# Patient Record
Sex: MALE | Race: ASIAN | NOT HISPANIC OR LATINO
[De-identification: names, ages, dates, MRNs, and addresses within clinical notes are randomized per-mention and may not be internally consistent; named-entity substitution may affect disease eponyms.]

---

## 2017-01-17 ENCOUNTER — RX RENEWAL (OUTPATIENT)
Age: 67
End: 2017-01-17

## 2017-01-20 ENCOUNTER — RX RENEWAL (OUTPATIENT)
Age: 67
End: 2017-01-20

## 2017-02-23 ENCOUNTER — LABORATORY RESULT (OUTPATIENT)
Age: 67
End: 2017-02-23

## 2017-02-24 ENCOUNTER — APPOINTMENT (OUTPATIENT)
Dept: INTERNAL MEDICINE | Facility: CLINIC | Age: 67
End: 2017-02-24

## 2017-02-25 ENCOUNTER — RESULT REVIEW (OUTPATIENT)
Age: 67
End: 2017-02-25

## 2017-03-01 ENCOUNTER — RESULT REVIEW (OUTPATIENT)
Age: 67
End: 2017-03-01

## 2017-03-10 ENCOUNTER — RX RENEWAL (OUTPATIENT)
Age: 67
End: 2017-03-10

## 2017-03-20 ENCOUNTER — APPOINTMENT (OUTPATIENT)
Dept: INTERNAL MEDICINE | Facility: CLINIC | Age: 67
End: 2017-03-20

## 2017-03-20 RX ORDER — CYANOCOBALAMIN 1000 UG/ML
1000 INJECTION INTRAMUSCULAR; SUBCUTANEOUS
Qty: 0 | Refills: 0 | Status: COMPLETED | OUTPATIENT
Start: 2017-03-20

## 2017-03-20 RX ADMIN — CYANOCOBALAMIN 0 MCG/ML: 1000 INJECTION, SOLUTION INTRAMUSCULAR at 00:00

## 2017-03-21 ENCOUNTER — RX RENEWAL (OUTPATIENT)
Age: 67
End: 2017-03-21

## 2017-03-21 ENCOUNTER — APPOINTMENT (OUTPATIENT)
Dept: INTERNAL MEDICINE | Facility: CLINIC | Age: 67
End: 2017-03-21

## 2017-03-22 ENCOUNTER — APPOINTMENT (OUTPATIENT)
Dept: INTERNAL MEDICINE | Facility: CLINIC | Age: 67
End: 2017-03-22

## 2017-03-22 RX ORDER — CYANOCOBALAMIN 1000 UG/ML
1000 INJECTION INTRAMUSCULAR; SUBCUTANEOUS
Qty: 0 | Refills: 0 | Status: COMPLETED | OUTPATIENT
Start: 2017-03-22

## 2017-03-22 RX ADMIN — CYANOCOBALAMIN 0 MCG/ML: 1000 INJECTION INTRAMUSCULAR; SUBCUTANEOUS at 00:00

## 2017-03-23 ENCOUNTER — APPOINTMENT (OUTPATIENT)
Dept: INTERNAL MEDICINE | Facility: CLINIC | Age: 67
End: 2017-03-23

## 2017-03-24 ENCOUNTER — APPOINTMENT (OUTPATIENT)
Dept: INTERNAL MEDICINE | Facility: CLINIC | Age: 67
End: 2017-03-24

## 2017-03-24 RX ORDER — CYANOCOBALAMIN 1000 UG/ML
1000 INJECTION INTRAMUSCULAR; SUBCUTANEOUS
Qty: 0 | Refills: 0 | Status: COMPLETED | OUTPATIENT
Start: 2017-03-24

## 2017-03-24 RX ADMIN — CYANOCOBALAMIN 0 MCG/ML: 1000 INJECTION INTRAMUSCULAR; SUBCUTANEOUS at 00:00

## 2017-03-30 ENCOUNTER — APPOINTMENT (OUTPATIENT)
Dept: INTERNAL MEDICINE | Facility: CLINIC | Age: 67
End: 2017-03-30

## 2017-03-30 VITALS — DIASTOLIC BLOOD PRESSURE: 70 MMHG | SYSTOLIC BLOOD PRESSURE: 110 MMHG

## 2017-03-30 DIAGNOSIS — Z23 ENCOUNTER FOR IMMUNIZATION: ICD-10-CM

## 2017-03-30 RX ORDER — CYANOCOBALAMIN 1000 UG/ML
1000 INJECTION INTRAMUSCULAR; SUBCUTANEOUS
Qty: 0 | Refills: 0 | Status: COMPLETED | OUTPATIENT
Start: 2017-03-30

## 2017-03-30 RX ADMIN — CYANOCOBALAMIN 0 MCG/ML: 1000 INJECTION INTRAMUSCULAR; SUBCUTANEOUS at 00:00

## 2017-04-05 ENCOUNTER — RX RENEWAL (OUTPATIENT)
Age: 67
End: 2017-04-05

## 2017-04-14 ENCOUNTER — RX RENEWAL (OUTPATIENT)
Age: 67
End: 2017-04-14

## 2017-05-31 ENCOUNTER — RX RENEWAL (OUTPATIENT)
Age: 67
End: 2017-05-31

## 2017-05-31 ENCOUNTER — APPOINTMENT (OUTPATIENT)
Dept: INTERNAL MEDICINE | Facility: CLINIC | Age: 67
End: 2017-05-31

## 2017-05-31 VITALS — SYSTOLIC BLOOD PRESSURE: 120 MMHG | DIASTOLIC BLOOD PRESSURE: 80 MMHG

## 2017-05-31 DIAGNOSIS — Z01.818 ENCOUNTER FOR OTHER PREPROCEDURAL EXAMINATION: ICD-10-CM

## 2017-05-31 RX ADMIN — CYANOCOBALAMIN 0 MCG/ML: 1000 INJECTION, SOLUTION INTRAMUSCULAR at 00:00

## 2017-06-01 RX ORDER — CYANOCOBALAMIN 1000 UG/ML
1000 INJECTION INTRAMUSCULAR; SUBCUTANEOUS
Qty: 0 | Refills: 0 | Status: COMPLETED | OUTPATIENT
Start: 2017-05-31

## 2017-06-26 ENCOUNTER — RX RENEWAL (OUTPATIENT)
Age: 67
End: 2017-06-26

## 2017-07-14 ENCOUNTER — RX RENEWAL (OUTPATIENT)
Age: 67
End: 2017-07-14

## 2017-07-17 ENCOUNTER — RX RENEWAL (OUTPATIENT)
Age: 67
End: 2017-07-17

## 2017-07-31 ENCOUNTER — APPOINTMENT (OUTPATIENT)
Dept: INTERNAL MEDICINE | Facility: CLINIC | Age: 67
End: 2017-07-31

## 2017-08-12 ENCOUNTER — RX RENEWAL (OUTPATIENT)
Age: 67
End: 2017-08-12

## 2017-08-14 ENCOUNTER — RX RENEWAL (OUTPATIENT)
Age: 67
End: 2017-08-14

## 2017-09-26 ENCOUNTER — MEDICATION RENEWAL (OUTPATIENT)
Age: 67
End: 2017-09-26

## 2017-10-02 ENCOUNTER — RX RENEWAL (OUTPATIENT)
Age: 67
End: 2017-10-02

## 2017-10-02 RX ORDER — LEVOTHYROXINE SODIUM 0.1 MG/1
100 TABLET ORAL DAILY
Qty: 30 | Refills: 3 | Status: DISCONTINUED | COMMUNITY
Start: 2017-03-10 | End: 2017-10-02

## 2017-10-03 ENCOUNTER — RX RENEWAL (OUTPATIENT)
Age: 67
End: 2017-10-03

## 2017-10-03 ENCOUNTER — MEDICATION RENEWAL (OUTPATIENT)
Age: 67
End: 2017-10-03

## 2017-10-06 ENCOUNTER — RX RENEWAL (OUTPATIENT)
Age: 67
End: 2017-10-06

## 2017-10-11 ENCOUNTER — RX RENEWAL (OUTPATIENT)
Age: 67
End: 2017-10-11

## 2017-10-16 ENCOUNTER — APPOINTMENT (OUTPATIENT)
Dept: INTERNAL MEDICINE | Facility: CLINIC | Age: 67
End: 2017-10-16
Payer: MEDICAID

## 2017-10-16 VITALS
DIASTOLIC BLOOD PRESSURE: 80 MMHG | WEIGHT: 196 LBS | HEIGHT: 68 IN | SYSTOLIC BLOOD PRESSURE: 120 MMHG | BODY MASS INDEX: 29.7 KG/M2

## 2017-10-16 DIAGNOSIS — F32.9 MAJOR DEPRESSIVE DISORDER, SINGLE EPISODE, UNSPECIFIED: ICD-10-CM

## 2017-10-16 PROCEDURE — 96372 THER/PROPH/DIAG INJ SC/IM: CPT

## 2017-10-16 PROCEDURE — 99214 OFFICE O/P EST MOD 30 MIN: CPT | Mod: 25

## 2017-10-16 PROCEDURE — 36415 COLL VENOUS BLD VENIPUNCTURE: CPT

## 2017-10-16 RX ORDER — CYANOCOBALAMIN 1000 UG/ML
1000 INJECTION INTRAMUSCULAR; SUBCUTANEOUS
Qty: 0 | Refills: 0 | Status: COMPLETED | OUTPATIENT
Start: 2017-10-16

## 2017-10-16 RX ADMIN — CYANOCOBALAMIN 0 MCG/ML: 1000 INJECTION INTRAMUSCULAR; SUBCUTANEOUS at 00:00

## 2017-10-17 ENCOUNTER — RESULT REVIEW (OUTPATIENT)
Age: 67
End: 2017-10-17

## 2017-10-17 LAB
ALBUMIN SERPL ELPH-MCNC: 4.3 G/DL
ALP BLD-CCNC: 55 U/L
ALT SERPL-CCNC: 29 U/L
ANION GAP SERPL CALC-SCNC: 17 MMOL/L
AST SERPL-CCNC: 25 U/L
BASOPHILS # BLD AUTO: 0.05 K/UL
BASOPHILS NFR BLD AUTO: 0.7 %
BILIRUB SERPL-MCNC: 0.6 MG/DL
BUN SERPL-MCNC: 13 MG/DL
CALCIUM SERPL-MCNC: 10.2 MG/DL
CHLORIDE SERPL-SCNC: 93 MMOL/L
CO2 SERPL-SCNC: 24 MMOL/L
CREAT SERPL-MCNC: 1.49 MG/DL
EOSINOPHIL # BLD AUTO: 0.21 K/UL
EOSINOPHIL NFR BLD AUTO: 3 %
GLUCOSE SERPL-MCNC: 198 MG/DL
HBA1C MFR BLD HPLC: 6.4 %
HCT VFR BLD CALC: 39.8 %
HGB BLD-MCNC: 13 G/DL
IMM GRANULOCYTES NFR BLD AUTO: 0.1 %
LYMPHOCYTES # BLD AUTO: 2.97 K/UL
LYMPHOCYTES NFR BLD AUTO: 42.2 %
MAN DIFF?: NORMAL
MCHC RBC-ENTMCNC: 28.2 PG
MCHC RBC-ENTMCNC: 32.7 GM/DL
MCV RBC AUTO: 86.3 FL
MONOCYTES # BLD AUTO: 0.41 K/UL
MONOCYTES NFR BLD AUTO: 5.8 %
NEUTROPHILS # BLD AUTO: 3.38 K/UL
NEUTROPHILS NFR BLD AUTO: 48.2 %
PLATELET # BLD AUTO: 282 K/UL
POTASSIUM SERPL-SCNC: 5.2 MMOL/L
PROT SERPL-MCNC: 8.1 G/DL
RBC # BLD: 4.61 M/UL
RBC # FLD: 15.6 %
SODIUM SERPL-SCNC: 134 MMOL/L
WBC # FLD AUTO: 7.03 K/UL

## 2017-10-30 ENCOUNTER — APPOINTMENT (OUTPATIENT)
Dept: INTERNAL MEDICINE | Facility: CLINIC | Age: 67
End: 2017-10-30
Payer: MEDICAID

## 2017-10-30 PROCEDURE — 96372 THER/PROPH/DIAG INJ SC/IM: CPT

## 2017-10-30 RX ORDER — CYANOCOBALAMIN 1000 UG/ML
1000 INJECTION INTRAMUSCULAR; SUBCUTANEOUS
Qty: 0 | Refills: 0 | Status: COMPLETED | OUTPATIENT
Start: 2017-10-30

## 2017-10-30 RX ADMIN — CYANOCOBALAMIN 0 MCG/ML: 1000 INJECTION INTRAMUSCULAR; SUBCUTANEOUS at 00:00

## 2017-10-31 ENCOUNTER — RESULT REVIEW (OUTPATIENT)
Age: 67
End: 2017-10-31

## 2017-10-31 LAB
ANION GAP SERPL CALC-SCNC: 15 MMOL/L
APPEARANCE: CLEAR
BILIRUBIN URINE: NEGATIVE
BLOOD URINE: NEGATIVE
BUN SERPL-MCNC: 9 MG/DL
CALCIUM SERPL-MCNC: 9.5 MG/DL
CHLORIDE SERPL-SCNC: 94 MMOL/L
CO2 SERPL-SCNC: 24 MMOL/L
COLOR: YELLOW
CREAT SERPL-MCNC: 1.1 MG/DL
CREAT SPEC-SCNC: 113 MG/DL
CREAT/PROT UR: 0.1 RATIO
GLUCOSE QUALITATIVE U: NEGATIVE MG/DL
GLUCOSE SERPL-MCNC: 159 MG/DL
KETONES URINE: NEGATIVE
LEUKOCYTE ESTERASE URINE: NEGATIVE
NITRITE URINE: NEGATIVE
PH URINE: 6.5
POTASSIUM SERPL-SCNC: 5.2 MMOL/L
PROT UR-MCNC: 10 MG/DL
PROTEIN URINE: NEGATIVE MG/DL
SODIUM ?TM SUB UR QN: 73 MMOL/L
SODIUM SERPL-SCNC: 133 MMOL/L
SPECIFIC GRAVITY URINE: 1.02
UROBILINOGEN URINE: NEGATIVE MG/DL

## 2017-11-06 ENCOUNTER — MEDICATION RENEWAL (OUTPATIENT)
Age: 67
End: 2017-11-06

## 2017-11-06 ENCOUNTER — RX RENEWAL (OUTPATIENT)
Age: 67
End: 2017-11-06

## 2017-12-06 ENCOUNTER — RX RENEWAL (OUTPATIENT)
Age: 67
End: 2017-12-06

## 2017-12-15 ENCOUNTER — RX RENEWAL (OUTPATIENT)
Age: 67
End: 2017-12-15

## 2017-12-15 RX ORDER — BLOOD SUGAR DIAGNOSTIC
STRIP MISCELLANEOUS 3 TIMES DAILY
Qty: 100 | Refills: 1 | Status: ACTIVE | COMMUNITY
Start: 2017-04-05 | End: 1900-01-01

## 2017-12-29 ENCOUNTER — RX RENEWAL (OUTPATIENT)
Age: 67
End: 2017-12-29

## 2018-01-09 ENCOUNTER — RX RENEWAL (OUTPATIENT)
Age: 68
End: 2018-01-09

## 2018-01-20 ENCOUNTER — RX RENEWAL (OUTPATIENT)
Age: 68
End: 2018-01-20

## 2018-02-20 ENCOUNTER — RX RENEWAL (OUTPATIENT)
Age: 68
End: 2018-02-20

## 2018-03-09 ENCOUNTER — RX RENEWAL (OUTPATIENT)
Age: 68
End: 2018-03-09

## 2018-03-12 ENCOUNTER — APPOINTMENT (OUTPATIENT)
Dept: INTERNAL MEDICINE | Facility: CLINIC | Age: 68
End: 2018-03-12
Payer: MEDICARE

## 2018-03-12 ENCOUNTER — LABORATORY RESULT (OUTPATIENT)
Age: 68
End: 2018-03-12

## 2018-03-12 ENCOUNTER — NON-APPOINTMENT (OUTPATIENT)
Age: 68
End: 2018-03-12

## 2018-03-12 VITALS
HEIGHT: 67 IN | SYSTOLIC BLOOD PRESSURE: 120 MMHG | BODY MASS INDEX: 30.29 KG/M2 | WEIGHT: 193 LBS | DIASTOLIC BLOOD PRESSURE: 70 MMHG

## 2018-03-12 DIAGNOSIS — Z87.898 PERSONAL HISTORY OF OTHER SPECIFIED CONDITIONS: ICD-10-CM

## 2018-03-12 DIAGNOSIS — H10.13 ACUTE ATOPIC CONJUNCTIVITIS, BILATERAL: ICD-10-CM

## 2018-03-12 DIAGNOSIS — E16.0 DRUG-INDUCED HYPOGLYCEMIA W/OUT COMA: ICD-10-CM

## 2018-03-12 DIAGNOSIS — M79.644 PAIN IN RIGHT FINGER(S): ICD-10-CM

## 2018-03-12 PROCEDURE — 96372 THER/PROPH/DIAG INJ SC/IM: CPT

## 2018-03-12 PROCEDURE — G0438: CPT

## 2018-03-12 PROCEDURE — G0402 INITIAL PREVENTIVE EXAM: CPT

## 2018-03-12 PROCEDURE — G0008: CPT

## 2018-03-12 PROCEDURE — 90732 PPSV23 VACC 2 YRS+ SUBQ/IM: CPT

## 2018-03-12 PROCEDURE — G0009: CPT

## 2018-03-12 PROCEDURE — 93000 ELECTROCARDIOGRAM COMPLETE: CPT

## 2018-03-12 PROCEDURE — 90686 IIV4 VACC NO PRSV 0.5 ML IM: CPT

## 2018-03-12 RX ORDER — CYANOCOBALAMIN 1000 UG/ML
1000 INJECTION INTRAMUSCULAR; SUBCUTANEOUS
Qty: 0 | Refills: 0 | Status: COMPLETED | OUTPATIENT
Start: 2018-03-12

## 2018-03-12 RX ADMIN — CYANOCOBALAMIN 0 MCG/ML: 1000 INJECTION INTRAMUSCULAR; SUBCUTANEOUS at 00:00

## 2018-03-26 ENCOUNTER — RX RENEWAL (OUTPATIENT)
Age: 68
End: 2018-03-26

## 2018-03-30 ENCOUNTER — RX RENEWAL (OUTPATIENT)
Age: 68
End: 2018-03-30

## 2018-04-13 ENCOUNTER — RX RENEWAL (OUTPATIENT)
Age: 68
End: 2018-04-13

## 2018-04-30 ENCOUNTER — RX RENEWAL (OUTPATIENT)
Age: 68
End: 2018-04-30

## 2018-05-01 ENCOUNTER — MED ADMIN CHARGE (OUTPATIENT)
Age: 68
End: 2018-05-01

## 2018-05-01 ENCOUNTER — APPOINTMENT (OUTPATIENT)
Dept: INTERNAL MEDICINE | Facility: CLINIC | Age: 68
End: 2018-05-01
Payer: MEDICARE

## 2018-05-01 VITALS — TEMPERATURE: 97.5 F | SYSTOLIC BLOOD PRESSURE: 110 MMHG | DIASTOLIC BLOOD PRESSURE: 80 MMHG

## 2018-05-01 DIAGNOSIS — M54.5 LOW BACK PAIN: ICD-10-CM

## 2018-05-01 DIAGNOSIS — G89.29 LOW BACK PAIN: ICD-10-CM

## 2018-05-01 PROCEDURE — 99214 OFFICE O/P EST MOD 30 MIN: CPT | Mod: 25

## 2018-05-01 PROCEDURE — 36415 COLL VENOUS BLD VENIPUNCTURE: CPT

## 2018-05-01 PROCEDURE — 96372 THER/PROPH/DIAG INJ SC/IM: CPT

## 2018-05-01 RX ORDER — CYANOCOBALAMIN 1000 UG/ML
1000 INJECTION INTRAMUSCULAR; SUBCUTANEOUS
Qty: 0 | Refills: 0 | Status: COMPLETED | OUTPATIENT
Start: 2018-05-01

## 2018-05-01 RX ORDER — CYCLOBENZAPRINE HYDROCHLORIDE 10 MG/1
10 TABLET, FILM COATED ORAL
Qty: 90 | Refills: 0 | Status: DISCONTINUED | COMMUNITY
Start: 2018-02-20 | End: 2018-05-01

## 2018-05-01 RX ADMIN — CYANOCOBALAMIN 0 MCG/ML: 1000 INJECTION INTRAMUSCULAR; SUBCUTANEOUS at 00:00

## 2018-05-03 ENCOUNTER — MEDICATION RENEWAL (OUTPATIENT)
Age: 68
End: 2018-05-03

## 2018-05-04 ENCOUNTER — APPOINTMENT (OUTPATIENT)
Dept: INTERNAL MEDICINE | Facility: CLINIC | Age: 68
End: 2018-05-04

## 2018-05-10 ENCOUNTER — MEDICATION RENEWAL (OUTPATIENT)
Age: 68
End: 2018-05-10

## 2018-05-11 ENCOUNTER — RX RENEWAL (OUTPATIENT)
Age: 68
End: 2018-05-11

## 2018-05-15 ENCOUNTER — RX RENEWAL (OUTPATIENT)
Age: 68
End: 2018-05-15

## 2018-07-03 ENCOUNTER — RX RENEWAL (OUTPATIENT)
Age: 68
End: 2018-07-03

## 2018-07-16 ENCOUNTER — RX RENEWAL (OUTPATIENT)
Age: 68
End: 2018-07-16

## 2018-07-17 ENCOUNTER — RX RENEWAL (OUTPATIENT)
Age: 68
End: 2018-07-17

## 2018-07-27 ENCOUNTER — RX RENEWAL (OUTPATIENT)
Age: 68
End: 2018-07-27

## 2018-07-30 ENCOUNTER — APPOINTMENT (OUTPATIENT)
Dept: INTERNAL MEDICINE | Facility: CLINIC | Age: 68
End: 2018-07-30
Payer: MEDICARE

## 2018-07-30 VITALS
SYSTOLIC BLOOD PRESSURE: 110 MMHG | DIASTOLIC BLOOD PRESSURE: 80 MMHG | BODY MASS INDEX: 29.82 KG/M2 | HEIGHT: 67 IN | WEIGHT: 190 LBS

## 2018-07-30 DIAGNOSIS — Z98.890 OTHER SPECIFIED POSTPROCEDURAL STATES: ICD-10-CM

## 2018-07-30 DIAGNOSIS — B35.1 TINEA UNGUIUM: ICD-10-CM

## 2018-07-30 PROCEDURE — 36415 COLL VENOUS BLD VENIPUNCTURE: CPT

## 2018-07-30 PROCEDURE — 99214 OFFICE O/P EST MOD 30 MIN: CPT | Mod: 25

## 2018-07-30 PROCEDURE — 90471 IMMUNIZATION ADMIN: CPT | Mod: GY

## 2018-07-30 PROCEDURE — 90715 TDAP VACCINE 7 YRS/> IM: CPT | Mod: GY

## 2018-07-30 PROCEDURE — 96372 THER/PROPH/DIAG INJ SC/IM: CPT

## 2018-07-30 RX ORDER — AZITHROMYCIN 250 MG/1
250 TABLET, FILM COATED ORAL
Qty: 1 | Refills: 0 | Status: DISCONTINUED | COMMUNITY
Start: 2018-05-03 | End: 2018-07-30

## 2018-07-30 RX ADMIN — CYANOCOBALAMIN 0 MCG/ML: 1000 INJECTION INTRAMUSCULAR; SUBCUTANEOUS at 00:00

## 2018-07-31 ENCOUNTER — APPOINTMENT (OUTPATIENT)
Dept: INTERNAL MEDICINE | Facility: CLINIC | Age: 68
End: 2018-07-31
Payer: MEDICARE

## 2018-07-31 ENCOUNTER — RESULT REVIEW (OUTPATIENT)
Age: 68
End: 2018-07-31

## 2018-07-31 LAB
ALBUMIN SERPL ELPH-MCNC: 4.8 G/DL
ALP BLD-CCNC: 73 U/L
ALT SERPL-CCNC: 19 U/L
ANION GAP SERPL CALC-SCNC: 15 MMOL/L
AST SERPL-CCNC: 28 U/L
BILIRUB SERPL-MCNC: 0.3 MG/DL
BUN SERPL-MCNC: 14 MG/DL
CALCIUM SERPL-MCNC: 10.4 MG/DL
CHLORIDE SERPL-SCNC: 92 MMOL/L
CO2 SERPL-SCNC: 25 MMOL/L
CREAT SERPL-MCNC: 1.18 MG/DL
GLUCOSE SERPL-MCNC: 69 MG/DL
POTASSIUM SERPL-SCNC: 5.9 MMOL/L
PROT SERPL-MCNC: 8 G/DL
SODIUM SERPL-SCNC: 132 MMOL/L

## 2018-07-31 PROCEDURE — 36415 COLL VENOUS BLD VENIPUNCTURE: CPT

## 2018-07-31 RX ORDER — LAMOTRIGINE 100 MG/1
100 TABLET ORAL
Refills: 0 | Status: DISCONTINUED | COMMUNITY
Start: 2018-07-30 | End: 2018-07-31

## 2018-07-31 RX ORDER — CYANOCOBALAMIN 1000 UG/ML
1000 INJECTION INTRAMUSCULAR; SUBCUTANEOUS
Qty: 0 | Refills: 0 | Status: COMPLETED | OUTPATIENT
Start: 2018-07-30

## 2018-08-01 LAB
ANION GAP SERPL CALC-SCNC: 14 MMOL/L
BUN SERPL-MCNC: 11 MG/DL
CALCIUM SERPL-MCNC: 9.8 MG/DL
CHLORIDE SERPL-SCNC: 93 MMOL/L
CO2 SERPL-SCNC: 25 MMOL/L
CREAT SERPL-MCNC: 1.15 MG/DL
GLUCOSE SERPL-MCNC: 126 MG/DL
POTASSIUM SERPL-SCNC: 5.1 MMOL/L
SODIUM SERPL-SCNC: 132 MMOL/L

## 2018-08-24 ENCOUNTER — RX RENEWAL (OUTPATIENT)
Age: 68
End: 2018-08-24

## 2018-08-29 LAB — C TETANI IGG SER-ACNC: <0.1 IU/ML

## 2018-10-01 ENCOUNTER — RX RENEWAL (OUTPATIENT)
Age: 68
End: 2018-10-01

## 2018-10-17 ENCOUNTER — RX RENEWAL (OUTPATIENT)
Age: 68
End: 2018-10-17

## 2018-10-17 VITALS
HEIGHT: 67 IN | DIASTOLIC BLOOD PRESSURE: 80 MMHG | WEIGHT: 222 LBS | SYSTOLIC BLOOD PRESSURE: 120 MMHG | BODY MASS INDEX: 34.84 KG/M2 | TEMPERATURE: 99.1 F

## 2018-11-19 ENCOUNTER — RX RENEWAL (OUTPATIENT)
Age: 68
End: 2018-11-19

## 2018-11-27 ENCOUNTER — RX RENEWAL (OUTPATIENT)
Age: 68
End: 2018-11-27

## 2018-11-27 ENCOUNTER — APPOINTMENT (OUTPATIENT)
Dept: INTERNAL MEDICINE | Facility: CLINIC | Age: 68
End: 2018-11-27
Payer: MEDICARE

## 2018-11-27 DIAGNOSIS — L03.115 CELLULITIS OF RIGHT LOWER LIMB: ICD-10-CM

## 2018-11-27 DIAGNOSIS — N39.0 URINARY TRACT INFECTION, SITE NOT SPECIFIED: ICD-10-CM

## 2018-11-27 LAB
ALBUMIN SERPL ELPH-MCNC: 4.4 G/DL
ALP BLD-CCNC: 78 U/L
ALT SERPL-CCNC: 17 U/L
ANION GAP SERPL CALC-SCNC: 15 MMOL/L
AST SERPL-CCNC: 19 U/L
BASOPHILS # BLD AUTO: 0.02 K/UL
BASOPHILS NFR BLD AUTO: 0.2 %
BILIRUB SERPL-MCNC: 0.5 MG/DL
BILIRUB UR QL STRIP: NEGATIVE
BUN SERPL-MCNC: 10 MG/DL
CALCIUM SERPL-MCNC: 9.8 MG/DL
CHLORIDE SERPL-SCNC: 91 MMOL/L
CLARITY UR: CLEAR
CO2 SERPL-SCNC: 25 MMOL/L
COLLECTION METHOD: NORMAL
CREAT SERPL-MCNC: 1.19 MG/DL
EOSINOPHIL # BLD AUTO: 0.18 K/UL
EOSINOPHIL NFR BLD AUTO: 2.1 %
GLUCOSE SERPL-MCNC: 126 MG/DL
GLUCOSE UR-MCNC: NEGATIVE
HBA1C MFR BLD HPLC: 6.1 %
HCG UR QL: 0.2 EU/DL
HCT VFR BLD CALC: 37.2 %
HGB BLD-MCNC: 12.3 G/DL
HGB UR QL STRIP.AUTO: ABNORMAL
IMM GRANULOCYTES NFR BLD AUTO: 0.1 %
KETONES UR-MCNC: NEGATIVE
LACTATE BLDA-MCNC: 1 MMOL/L
LEUKOCYTE ESTERASE UR QL STRIP: ABNORMAL
LPL SERPL-CCNC: 42 U/L
LYMPHOCYTES # BLD AUTO: 1.52 K/UL
LYMPHOCYTES NFR BLD AUTO: 17.6 %
MAN DIFF?: NORMAL
MCHC RBC-ENTMCNC: 26.7 PG
MCHC RBC-ENTMCNC: 33.1 GM/DL
MCV RBC AUTO: 80.7 FL
MONOCYTES # BLD AUTO: 0.83 K/UL
MONOCYTES NFR BLD AUTO: 9.6 %
NEUTROPHILS # BLD AUTO: 6.07 K/UL
NEUTROPHILS NFR BLD AUTO: 70.4 %
NITRITE UR QL STRIP: NEGATIVE
PH UR STRIP: 7
PLATELET # BLD AUTO: 311 K/UL
POTASSIUM SERPL-SCNC: 5 MMOL/L
PROT SERPL-MCNC: 8.1 G/DL
PROT UR STRIP-MCNC: ABNORMAL
RBC # BLD: 4.61 M/UL
RBC # FLD: 14.6 %
SODIUM SERPL-SCNC: 131 MMOL/L
SP GR UR STRIP: 1.01
TSH SERPL-ACNC: 10.5 UIU/ML
WBC # FLD AUTO: 8.63 K/UL

## 2018-11-27 PROCEDURE — 81003 URINALYSIS AUTO W/O SCOPE: CPT | Mod: QW

## 2018-11-27 PROCEDURE — 36415 COLL VENOUS BLD VENIPUNCTURE: CPT

## 2018-11-27 PROCEDURE — 99215 OFFICE O/P EST HI 40 MIN: CPT | Mod: 25

## 2018-11-27 PROCEDURE — 96372 THER/PROPH/DIAG INJ SC/IM: CPT

## 2018-11-27 RX ORDER — CYANOCOBALAMIN 1000 UG/ML
1000 INJECTION INTRAMUSCULAR; SUBCUTANEOUS
Qty: 0 | Refills: 0 | Status: COMPLETED | OUTPATIENT
Start: 2018-11-27

## 2018-11-27 RX ORDER — TERBINAFINE HYDROCHLORIDE 250 MG/1
250 TABLET ORAL
Refills: 0 | Status: DISCONTINUED | COMMUNITY
Start: 2018-07-31 | End: 2018-11-27

## 2018-11-27 RX ORDER — CEPHALEXIN 500 MG/1
500 TABLET ORAL EVERY 6 HOURS
Qty: 28 | Refills: 0 | Status: DISCONTINUED | COMMUNITY
Start: 2018-07-30 | End: 2018-11-27

## 2018-11-27 RX ORDER — CYANOCOBALAMIN 1000 UG/ML
1000 INJECTION INTRAMUSCULAR; SUBCUTANEOUS
Qty: 0.5 | Refills: 0 | Status: DISCONTINUED | COMMUNITY
Start: 2018-07-30 | End: 2018-11-27

## 2018-11-27 RX ADMIN — CYANOCOBALAMIN 0 MCG/ML: 1000 INJECTION INTRAMUSCULAR; SUBCUTANEOUS at 00:00

## 2018-11-28 LAB — BACTERIA UR CULT: NORMAL

## 2018-12-05 ENCOUNTER — APPOINTMENT (OUTPATIENT)
Dept: INTERNAL MEDICINE | Facility: CLINIC | Age: 68
End: 2018-12-05
Payer: MEDICARE

## 2018-12-05 VITALS — DIASTOLIC BLOOD PRESSURE: 80 MMHG | SYSTOLIC BLOOD PRESSURE: 120 MMHG | TEMPERATURE: 98 F

## 2018-12-05 DIAGNOSIS — R80.9 PROTEINURIA, UNSPECIFIED: ICD-10-CM

## 2018-12-05 LAB
BILIRUB UR QL STRIP: ABNORMAL
CLARITY UR: CLEAR
COLLECTION METHOD: NORMAL
GLUCOSE UR-MCNC: NEGATIVE
HCG UR QL: 0.2 EU/DL
HGB UR QL STRIP.AUTO: NEGATIVE
KETONES UR-MCNC: ABNORMAL
LEUKOCYTE ESTERASE UR QL STRIP: NEGATIVE
NITRITE UR QL STRIP: NEGATIVE
PH UR STRIP: 5.5
PROT UR STRIP-MCNC: ABNORMAL
SP GR UR STRIP: 1.02

## 2018-12-05 PROCEDURE — 99214 OFFICE O/P EST MOD 30 MIN: CPT | Mod: 25

## 2018-12-05 PROCEDURE — 81003 URINALYSIS AUTO W/O SCOPE: CPT | Mod: QW

## 2018-12-05 PROCEDURE — 96372 THER/PROPH/DIAG INJ SC/IM: CPT

## 2018-12-05 RX ORDER — AMOXICILLIN 500 MG/1
500 CAPSULE ORAL
Qty: 21 | Refills: 0 | Status: COMPLETED | COMMUNITY
Start: 2018-08-16

## 2018-12-05 RX ORDER — CEPHALEXIN 500 MG/1
500 CAPSULE ORAL
Qty: 28 | Refills: 0 | Status: COMPLETED | COMMUNITY
Start: 2018-07-30

## 2018-12-05 RX ORDER — SULFAMETHOXAZOLE AND TRIMETHOPRIM 800; 160 MG/1; MG/1
800-160 TABLET ORAL TWICE DAILY
Qty: 20 | Refills: 0 | Status: DISCONTINUED | COMMUNITY
Start: 2018-11-27 | End: 2018-12-05

## 2018-12-05 RX ORDER — CEFPODOXIME PROXETIL 100 MG/1
100 TABLET, FILM COATED ORAL
Qty: 20 | Refills: 0 | Status: COMPLETED | COMMUNITY
Start: 2018-11-25

## 2018-12-05 RX ORDER — OXYCODONE AND ACETAMINOPHEN 5; 325 MG/1; MG/1
5-325 TABLET ORAL
Qty: 20 | Refills: 0 | Status: COMPLETED | COMMUNITY
Start: 2018-07-18

## 2018-12-05 RX ORDER — DICLOFENAC SODIUM 10 MG/G
1 GEL TOPICAL
Qty: 100 | Refills: 0 | Status: COMPLETED | COMMUNITY
Start: 2018-06-12

## 2018-12-05 RX ORDER — CYANOCOBALAMIN 1000 UG/ML
1000 INJECTION INTRAMUSCULAR; SUBCUTANEOUS
Qty: 0 | Refills: 0 | Status: COMPLETED | OUTPATIENT
Start: 2018-12-05

## 2018-12-05 RX ADMIN — CYANOCOBALAMIN 0 MCG/ML: 1000 INJECTION INTRAMUSCULAR; SUBCUTANEOUS at 00:00

## 2018-12-06 ENCOUNTER — APPOINTMENT (OUTPATIENT)
Dept: INTERNAL MEDICINE | Facility: CLINIC | Age: 68
End: 2018-12-06
Payer: MEDICARE

## 2018-12-06 PROCEDURE — 96372 THER/PROPH/DIAG INJ SC/IM: CPT

## 2018-12-06 RX ORDER — CYANOCOBALAMIN 1000 UG/ML
1000 INJECTION INTRAMUSCULAR; SUBCUTANEOUS
Qty: 0 | Refills: 0 | Status: COMPLETED | OUTPATIENT
Start: 2018-12-06

## 2018-12-06 RX ADMIN — CYANOCOBALAMIN 1 MCG/ML: 1000 INJECTION INTRAMUSCULAR; SUBCUTANEOUS at 00:00

## 2018-12-07 ENCOUNTER — RESULT REVIEW (OUTPATIENT)
Age: 68
End: 2018-12-07

## 2018-12-07 ENCOUNTER — APPOINTMENT (OUTPATIENT)
Dept: INTERNAL MEDICINE | Facility: CLINIC | Age: 68
End: 2018-12-07
Payer: MEDICARE

## 2018-12-07 ENCOUNTER — MED ADMIN CHARGE (OUTPATIENT)
Age: 68
End: 2018-12-07

## 2018-12-07 LAB
BACTERIA UR CULT: NORMAL
C TRACH RRNA SPEC QL NAA+PROBE: NOT DETECTED
N GONORRHOEA RRNA SPEC QL NAA+PROBE: NOT DETECTED
SOURCE AMPLIFICATION: NORMAL

## 2018-12-07 PROCEDURE — 96372 THER/PROPH/DIAG INJ SC/IM: CPT

## 2018-12-07 RX ORDER — CYANOCOBALAMIN 1000 UG/ML
1000 INJECTION INTRAMUSCULAR; SUBCUTANEOUS
Qty: 0 | Refills: 0 | Status: COMPLETED | OUTPATIENT
Start: 2018-12-07

## 2018-12-07 RX ADMIN — CYANOCOBALAMIN 1 MCG/ML: 1000 INJECTION INTRAMUSCULAR; SUBCUTANEOUS at 00:00

## 2018-12-18 ENCOUNTER — RX RENEWAL (OUTPATIENT)
Age: 68
End: 2018-12-18

## 2018-12-19 ENCOUNTER — RX RENEWAL (OUTPATIENT)
Age: 68
End: 2018-12-19

## 2018-12-24 ENCOUNTER — RX RENEWAL (OUTPATIENT)
Age: 68
End: 2018-12-24

## 2019-01-22 ENCOUNTER — RX RENEWAL (OUTPATIENT)
Age: 69
End: 2019-01-22

## 2019-01-23 ENCOUNTER — INBOUND DOCUMENT (OUTPATIENT)
Age: 69
End: 2019-01-23

## 2019-01-29 ENCOUNTER — RX RENEWAL (OUTPATIENT)
Age: 69
End: 2019-01-29

## 2019-02-13 ENCOUNTER — RX RENEWAL (OUTPATIENT)
Age: 69
End: 2019-02-13

## 2019-02-25 ENCOUNTER — RX RENEWAL (OUTPATIENT)
Age: 69
End: 2019-02-25

## 2019-03-08 ENCOUNTER — RX RENEWAL (OUTPATIENT)
Age: 69
End: 2019-03-08

## 2019-04-04 ENCOUNTER — MEDICATION RENEWAL (OUTPATIENT)
Age: 69
End: 2019-04-04

## 2019-04-05 ENCOUNTER — RX RENEWAL (OUTPATIENT)
Age: 69
End: 2019-04-05

## 2019-05-14 ENCOUNTER — RX RENEWAL (OUTPATIENT)
Age: 69
End: 2019-05-14

## 2019-05-24 ENCOUNTER — INBOUND DOCUMENT (OUTPATIENT)
Age: 69
End: 2019-05-24

## 2019-05-29 ENCOUNTER — MEDICATION RENEWAL (OUTPATIENT)
Age: 69
End: 2019-05-29

## 2019-05-31 ENCOUNTER — LABORATORY RESULT (OUTPATIENT)
Age: 69
End: 2019-05-31

## 2019-05-31 ENCOUNTER — APPOINTMENT (OUTPATIENT)
Dept: INTERNAL MEDICINE | Facility: CLINIC | Age: 69
End: 2019-05-31
Payer: MEDICARE

## 2019-05-31 VITALS — BODY MASS INDEX: 29.51 KG/M2 | WEIGHT: 188 LBS | HEIGHT: 67 IN

## 2019-05-31 VITALS — DIASTOLIC BLOOD PRESSURE: 80 MMHG | SYSTOLIC BLOOD PRESSURE: 120 MMHG

## 2019-05-31 DIAGNOSIS — N39.0 URINARY TRACT INFECTION, SITE NOT SPECIFIED: ICD-10-CM

## 2019-05-31 DIAGNOSIS — K21.9 GASTRO-ESOPHAGEAL REFLUX DISEASE W/OUT ESOPHAGITIS: ICD-10-CM

## 2019-05-31 PROCEDURE — 99214 OFFICE O/P EST MOD 30 MIN: CPT | Mod: 25

## 2019-05-31 PROCEDURE — 36415 COLL VENOUS BLD VENIPUNCTURE: CPT

## 2019-05-31 PROCEDURE — 96372 THER/PROPH/DIAG INJ SC/IM: CPT

## 2019-05-31 RX ORDER — SULFAMETHOXAZOLE AND TRIMETHOPRIM 800; 160 MG/1; MG/1
800-160 TABLET ORAL
Qty: 28 | Refills: 0 | Status: DISCONTINUED | COMMUNITY
Start: 2018-12-05 | End: 2019-05-31

## 2019-05-31 RX ORDER — CYANOCOBALAMIN 1000 UG/ML
1000 INJECTION INTRAMUSCULAR; SUBCUTANEOUS
Qty: 0 | Refills: 0 | Status: COMPLETED | OUTPATIENT
Start: 2019-05-31

## 2019-05-31 RX ADMIN — CYANOCOBALAMIN 0 MCG/ML: 1000 INJECTION INTRAMUSCULAR; SUBCUTANEOUS at 00:00

## 2019-06-01 PROBLEM — K21.9 ACID REFLUX: Status: ACTIVE | Noted: 2019-05-31

## 2019-06-03 ENCOUNTER — RESULT REVIEW (OUTPATIENT)
Age: 69
End: 2019-06-03

## 2019-06-03 LAB — VIT B12 SERPL-MCNC: 946 PG/ML

## 2019-06-12 ENCOUNTER — RX RENEWAL (OUTPATIENT)
Age: 69
End: 2019-06-12

## 2019-06-12 LAB — 25(OH)D3 SERPL-MCNC: 39.9 NG/ML

## 2019-07-17 ENCOUNTER — RX RENEWAL (OUTPATIENT)
Age: 69
End: 2019-07-17

## 2019-07-26 ENCOUNTER — MEDICATION RENEWAL (OUTPATIENT)
Age: 69
End: 2019-07-26

## 2019-08-14 ENCOUNTER — RESULT REVIEW (OUTPATIENT)
Age: 69
End: 2019-08-14

## 2019-08-14 LAB
ALBUMIN SERPL ELPH-MCNC: 4.7 G/DL
ALP BLD-CCNC: 57 U/L
ALT SERPL-CCNC: 22 U/L
ANION GAP SERPL CALC-SCNC: 13 MMOL/L
AST SERPL-CCNC: 42 U/L
BASOPHILS # BLD AUTO: 0.06 K/UL
BASOPHILS NFR BLD AUTO: 0.8 %
BILIRUB SERPL-MCNC: 0.5 MG/DL
BUN SERPL-MCNC: 12 MG/DL
CALCIUM SERPL-MCNC: 9.9 MG/DL
CHLORIDE SERPL-SCNC: 97 MMOL/L
CO2 SERPL-SCNC: 23 MMOL/L
CREAT SERPL-MCNC: 1.1 MG/DL
CREAT SPEC-SCNC: 85 MG/DL
EOSINOPHIL # BLD AUTO: 0.26 K/UL
EOSINOPHIL NFR BLD AUTO: 3.6 %
ESTIMATED AVERAGE GLUCOSE: 123 MG/DL
GLUCOSE SERPL-MCNC: 87 MG/DL
HBA1C MFR BLD HPLC: 5.9 %
HCT VFR BLD CALC: 39.4 %
HGB BLD-MCNC: 12.9 G/DL
IMM GRANULOCYTES NFR BLD AUTO: 0.1 %
LYMPHOCYTES # BLD AUTO: 2.97 K/UL
LYMPHOCYTES NFR BLD AUTO: 40.7 %
MAN DIFF?: NORMAL
MCHC RBC-ENTMCNC: 27.9 PG
MCHC RBC-ENTMCNC: 32.7 GM/DL
MCV RBC AUTO: 85.1 FL
MICROALBUMIN 24H UR DL<=1MG/L-MCNC: 6.3 MG/DL
MICROALBUMIN/CREAT 24H UR-RTO: 74 MG/G
MONOCYTES # BLD AUTO: 0.63 K/UL
MONOCYTES NFR BLD AUTO: 8.6 %
NEUTROPHILS # BLD AUTO: 3.36 K/UL
NEUTROPHILS NFR BLD AUTO: 46.2 %
PLATELET # BLD AUTO: 334 K/UL
POTASSIUM SERPL-SCNC: 4.9 MMOL/L
PROT SERPL-MCNC: 7.6 G/DL
RBC # BLD: 4.63 M/UL
RBC # FLD: 15.8 %
SODIUM SERPL-SCNC: 133 MMOL/L
TSH SERPL-ACNC: 12.6 UIU/ML
WBC # FLD AUTO: 7.29 K/UL

## 2019-08-19 LAB
CHOLEST SERPL-MCNC: 213 MG/DL
CHOLEST/HDLC SERPL: 3.7 RATIO
HDLC SERPL-MCNC: 57 MG/DL
LDLC SERPL CALC-MCNC: 129 MG/DL
TRIGL SERPL-MCNC: 137 MG/DL

## 2019-10-29 ENCOUNTER — RESULT CHARGE (OUTPATIENT)
Age: 69
End: 2019-10-29

## 2019-10-29 ENCOUNTER — APPOINTMENT (OUTPATIENT)
Dept: INTERNAL MEDICINE | Facility: CLINIC | Age: 69
End: 2019-10-29
Payer: MEDICARE

## 2019-10-29 ENCOUNTER — MED ADMIN CHARGE (OUTPATIENT)
Age: 69
End: 2019-10-29

## 2019-10-29 VITALS
TEMPERATURE: 99 F | WEIGHT: 177 LBS | DIASTOLIC BLOOD PRESSURE: 70 MMHG | BODY MASS INDEX: 27.78 KG/M2 | HEIGHT: 67 IN | SYSTOLIC BLOOD PRESSURE: 120 MMHG

## 2019-10-29 VITALS — WEIGHT: 177 LBS | BODY MASS INDEX: 27.78 KG/M2 | TEMPERATURE: 99 F | HEIGHT: 67 IN

## 2019-10-29 PROCEDURE — 90674 CCIIV4 VAC NO PRSV 0.5 ML IM: CPT

## 2019-10-29 PROCEDURE — 99214 OFFICE O/P EST MOD 30 MIN: CPT | Mod: 25

## 2019-10-29 PROCEDURE — 81003 URINALYSIS AUTO W/O SCOPE: CPT | Mod: QW

## 2019-10-29 PROCEDURE — G0008: CPT

## 2019-10-29 PROCEDURE — 36415 COLL VENOUS BLD VENIPUNCTURE: CPT

## 2019-10-29 PROCEDURE — 96372 THER/PROPH/DIAG INJ SC/IM: CPT

## 2019-10-30 ENCOUNTER — MED ADMIN CHARGE (OUTPATIENT)
Age: 69
End: 2019-10-30

## 2019-10-30 LAB
ESTIMATED AVERAGE GLUCOSE: 97 MG/DL
HBA1C MFR BLD HPLC: 5 %
VIT B12 SERPL-MCNC: >2000 PG/ML

## 2019-10-30 RX ORDER — CYANOCOBALAMIN 1000 UG/ML
1000 INJECTION INTRAMUSCULAR; SUBCUTANEOUS
Qty: 0 | Refills: 0 | Status: COMPLETED | OUTPATIENT
Start: 2019-10-30

## 2019-10-30 RX ADMIN — CYANOCOBALAMIN 0 MCG/ML: 1000 INJECTION INTRAMUSCULAR; SUBCUTANEOUS at 00:00

## 2019-11-01 LAB
ALBUMIN SERPL ELPH-MCNC: 4.8 G/DL
ALP BLD-CCNC: 58 U/L
ALT SERPL-CCNC: 45 U/L
ANION GAP SERPL CALC-SCNC: 23 MMOL/L
AST SERPL-CCNC: 82 U/L
BACTERIA UR CULT: NORMAL
BASOPHILS # BLD AUTO: 0.09 K/UL
BASOPHILS NFR BLD AUTO: 1.6 %
BILIRUB SERPL-MCNC: 0.5 MG/DL
BUN SERPL-MCNC: 9 MG/DL
CALCIUM SERPL-MCNC: 9.8 MG/DL
CHLORIDE SERPL-SCNC: 89 MMOL/L
CHOLEST SERPL-MCNC: 224 MG/DL
CHOLEST/HDLC SERPL: 1.7 RATIO
CO2 SERPL-SCNC: 22 MMOL/L
CREAT SERPL-MCNC: 1.24 MG/DL
EOSINOPHIL # BLD AUTO: 0.19 K/UL
EOSINOPHIL NFR BLD AUTO: 3.3 %
GLUCOSE SERPL-MCNC: 78 MG/DL
HCT VFR BLD CALC: 38.1 %
HDLC SERPL-MCNC: 130 MG/DL
HGB BLD-MCNC: 12.8 G/DL
IMM GRANULOCYTES NFR BLD AUTO: 0.2 %
LDLC SERPL CALC-MCNC: 77 MG/DL
LYMPHOCYTES # BLD AUTO: 2.63 K/UL
LYMPHOCYTES NFR BLD AUTO: 46.1 %
MAN DIFF?: NORMAL
MCHC RBC-ENTMCNC: 31.8 PG
MCHC RBC-ENTMCNC: 33.6 GM/DL
MCV RBC AUTO: 94.8 FL
MONOCYTES # BLD AUTO: 0.57 K/UL
MONOCYTES NFR BLD AUTO: 10 %
NEUTROPHILS # BLD AUTO: 2.22 K/UL
NEUTROPHILS NFR BLD AUTO: 38.8 %
PLATELET # BLD AUTO: 232 K/UL
POTASSIUM SERPL-SCNC: 5.3 MMOL/L
PROT SERPL-MCNC: 7.2 G/DL
RBC # BLD: 4.02 M/UL
RBC # FLD: 16.3 %
SODIUM SERPL-SCNC: 134 MMOL/L
TRIGL SERPL-MCNC: 83 MG/DL
TSH SERPL-ACNC: 6.35 UIU/ML
WBC # FLD AUTO: 5.71 K/UL

## 2019-11-01 RX ORDER — GLIPIZIDE 5 MG/1
5 TABLET ORAL TWICE DAILY
Qty: 90 | Refills: 0 | Status: DISCONTINUED | COMMUNITY
Start: 2018-01-29 | End: 2019-11-01

## 2019-11-02 ENCOUNTER — TRANSCRIPTION ENCOUNTER (OUTPATIENT)
Age: 69
End: 2019-11-02

## 2019-11-23 ENCOUNTER — RX RENEWAL (OUTPATIENT)
Age: 69
End: 2019-11-23

## 2019-12-03 ENCOUNTER — EMERGENCY (EMERGENCY)
Facility: HOSPITAL | Age: 69
LOS: 1 days | Discharge: HOME | End: 2019-12-03
Attending: EMERGENCY MEDICINE
Payer: MEDICARE

## 2019-12-03 VITALS
RESPIRATION RATE: 16 BRPM | OXYGEN SATURATION: 100 % | DIASTOLIC BLOOD PRESSURE: 99 MMHG | HEART RATE: 105 BPM | SYSTOLIC BLOOD PRESSURE: 161 MMHG | HEIGHT: 69 IN | TEMPERATURE: 98 F | WEIGHT: 169.98 LBS

## 2019-12-03 VITALS
TEMPERATURE: 99 F | DIASTOLIC BLOOD PRESSURE: 87 MMHG | OXYGEN SATURATION: 98 % | SYSTOLIC BLOOD PRESSURE: 157 MMHG | RESPIRATION RATE: 19 BRPM | HEART RATE: 108 BPM

## 2019-12-03 LAB — ETHANOL SERPL-MCNC: 207 MG/DL — HIGH (ref 0–10)

## 2019-12-03 PROCEDURE — 99283 EMERGENCY DEPT VISIT LOW MDM: CPT

## 2019-12-03 PROCEDURE — 80307 DRUG TEST PRSMV CHEM ANLYZR: CPT

## 2019-12-03 PROCEDURE — 99285 EMERGENCY DEPT VISIT HI MDM: CPT

## 2019-12-03 PROCEDURE — 82962 GLUCOSE BLOOD TEST: CPT

## 2019-12-03 RX ORDER — SODIUM CHLORIDE 9 MG/ML
1000 INJECTION INTRAMUSCULAR; INTRAVENOUS; SUBCUTANEOUS ONCE
Refills: 0 | Status: COMPLETED | OUTPATIENT
Start: 2019-12-03 | End: 2019-12-03

## 2019-12-03 RX ADMIN — SODIUM CHLORIDE 1000 MILLILITER(S): 9 INJECTION INTRAMUSCULAR; INTRAVENOUS; SUBCUTANEOUS at 18:17

## 2019-12-03 NOTE — ED PROVIDER NOTE - PROGRESS NOTE DETAILS
spoke with wife at home and reports patient has had prior DUI and had court mandated urinating on test and was sober, started to relapse a few months ago and has been drinking a lot today, was verbally abusive and throwing wife food out and family is trying to go to family court to get court mandated arrest when patient drinks. spoke with  who will assess patient. requesting BAL at this time. family asking patient stay in ER overnight. does not feel like he is a danger to himself or them. AK: Seen by social work. Agrees that patient can leave, safe dispo. Clinically sober. Will call self cab. Patient continues to be cooperative in the ED.

## 2019-12-03 NOTE — CHART NOTE - NSCHARTNOTEFT_GEN_A_CORE
Emergency Room : LMSW received a referral from the medical team for high risk screening for substance abuse.  Patient is a 68 year old  male presented to the ED foe ETOH intoxication and aggression.  LMSW introduced herself to the patient and he verbalized understanding the role of the . Patient is alert and oriented x 4 spheres.  Demographic information was reviewed and confirmed.  Patient informed he resides in private home with his wife Thi Fernando ([phone]). LMSW explored with patient his substance abuse history. Patient informed he was sober for 6 years and relapsed 2 months ago when his daughter moved back in. Per patient daughter also suffers from alcoholism. Daughter has since moved out but he continues to drink.   Patient denies aggression today at home as he was in his room when the police arrived to the home.  Patient acknowledges drinking a liter a vodka today but noted he believes he can abstain from drinking.  Patient noted he was arrested in 2012 where he received a DUI.  Patient was placed on probation for 3 years and recently got back his drivers license.  LMSW discussed with patient different treatment options inpatient versus outpatient treatment programs.  Patient notes he would like to return to the [facility] for outpatient treatment.  Patient declined alternative resources offered by [facility].  LMSW attempted to contact the patient's spouse for collateral information; LMSW left message void of PHI.  Update provided to the medical team.  Patient declined to identify a primary caregiver.  LMSW will followup as needed.

## 2019-12-03 NOTE — ED PROVIDER NOTE - CLINICAL SUMMARY MEDICAL DECISION MAKING FREE TEXT BOX
69 yo M, w/ PMH of HTN and DM, Brought in by police from home d/t argument with wife in setting of alcohol intoxication. Patient clinically sober on arrival to ED and currently denies any complaints. Cooperative with history and physical exam. Demonstrates capacity. Currently has no complaints and physical exam unremarkable. Will discharge home w/ PMD followup.

## 2019-12-03 NOTE — ED PROVIDER NOTE - NEUROLOGICAL, MLM
Alert and oriented, no focal deficits, no motor or sensory deficits. Cranial nerves intact, intact finger to nose, 5/5 strength all extremities, negative Pronator Drift, negative Romberg.

## 2019-12-03 NOTE — ED ADULT NURSE NOTE - NSIMPLEMENTINTERV_GEN_ALL_ED
Implemented All Fall with Harm Risk Interventions:  [place] to call system. Call bell, personal items and telephone within reach. Instruct patient to call for assistance. Room bathroom lighting operational. Non-slip footwear when patient is off stretcher. Physically safe environment: no spills, clutter or unnecessary equipment. Stretcher in lowest position, wheels locked, appropriate side rails in place. Provide visual cue, wrist band, yellow gown, etc. Monitor gait and stability. Monitor for mental status changes and reorient to person, place, and time. Review medications for side effects contributing to fall risk. Reinforce activity limits and safety measures with patient and family. Provide visual clues: red socks.

## 2019-12-03 NOTE — ED ADULT NURSE NOTE - OBJECTIVE STATEMENT
68y M aaox4 ambulatory presents to ED via EMS from home, As per EMS pt has been drinking for 2 months, today pt became violent, aggressive and his wife called 911, As per pt he has been drinking for almost 2 months, his last drink was today 9 am, pt very cooperative, calm. denies DT's, withdrawn from alcohol, Pt no distress noted, no shaking, no blurry vision, no weakness, no HA. Denies CP, SOB, abdominal pain, n/v/d no  issues . Pt placed CO 1:1, called security at 1735, security got here at 1753. Dr Verdugo aware and going to call his wife to get all the history 68y M aaox4 ambulatory presents to ED via EMS from home, As per EMS pt has been drinking for 2 months, today pt became violent, aggressive and his wife called 911, As per pt he has been drinking for almost 2 months, his last drink was today 9 am, pt very cooperative, calm. denies DT's, withdrawn from alcohol, Pt no distress noted, no tremors , no blurry vision, no weakness, no HA. Denies CP, SOB, abdominal pain, n/v/d no  issues . Pt placed CO 1:1, called security at 1735, security got here at 1753. Dr Verdugo aware and going to call his wife to get all the history

## 2019-12-03 NOTE — ED PROVIDER NOTE - PATIENT PORTAL LINK FT
You can access the FollowMyHealth Patient Portal offered by [facility] by registering at the following website: [email or web address]. By joining [facility]’s FollowMyHealth portal, you will also be able to view your health information using other applications (apps) compatible with our system.

## 2019-12-03 NOTE — ED ADULT NURSE REASSESSMENT NOTE - NS ED NURSE REASSESS COMMENT FT1
pt provide water, pillow, blanket,  comfortable lying in the bed. CO 1:1
2023 pt continued CO pending psych eval/safety precautions maintained/pending dispo/gc

## 2019-12-03 NOTE — ED PROVIDER NOTE - OBJECTIVE STATEMENT
67 yo M, w/ PMH of HTN, DM, brought in by police from home d/t alcohol intoxication. Patient does not appear intoxicated in ED. Patient states that he no longer feels intoxicated and he currently feels sober. Denies any current complaints. Denies headache, neck stiffness, fever/chills, vision change, chest pain, shortness of breath, difficulty breathing, palpitations, lightheadedness, weakness, dizziness, numbness, tingling, abdominal pain, nausea, vomiting, diarrhea, dysuria, hematuria, syncope. Denies recent travel or recent illness. States that he has been drinking a quart of Smirnoff vodka daily for 2 months. He drank one quart of vodka today as he usually does, and got into an argument with his wife, who called police, who brought him to [facility] ED. Patient states that he feels ready to go home.     PMD: Dr. Lou  [place]

## 2019-12-09 ENCOUNTER — APPOINTMENT (OUTPATIENT)
Dept: INTERNAL MEDICINE | Facility: CLINIC | Age: 69
End: 2019-12-09
Payer: MEDICARE

## 2019-12-09 VITALS — HEART RATE: 78 BPM | SYSTOLIC BLOOD PRESSURE: 120 MMHG | RESPIRATION RATE: 14 BRPM | DIASTOLIC BLOOD PRESSURE: 70 MMHG

## 2019-12-09 VITALS — HEIGHT: 67 IN | BODY MASS INDEX: 27.15 KG/M2 | WEIGHT: 173 LBS

## 2019-12-09 PROBLEM — I10 ESSENTIAL (PRIMARY) HYPERTENSION: Chronic | Status: ACTIVE | Noted: 2019-12-03

## 2019-12-09 PROBLEM — E11.9 TYPE 2 DIABETES MELLITUS WITHOUT COMPLICATIONS: Chronic | Status: ACTIVE | Noted: 2019-12-03

## 2019-12-09 LAB
BILIRUB UR QL STRIP: NEGATIVE
CLARITY UR: CLEAR
COLLECTION METHOD: NORMAL
GLUCOSE UR-MCNC: NEGATIVE
HCG UR QL: 0.2 EU/DL
HGB UR QL STRIP.AUTO: ABNORMAL
KETONES UR-MCNC: NEGATIVE
LEUKOCYTE ESTERASE UR QL STRIP: ABNORMAL
NITRITE UR QL STRIP: NEGATIVE
PH UR STRIP: 6
PROT UR STRIP-MCNC: ABNORMAL
SP GR UR STRIP: 1.02

## 2019-12-09 PROCEDURE — 81003 URINALYSIS AUTO W/O SCOPE: CPT | Mod: QW

## 2019-12-09 PROCEDURE — 99213 OFFICE O/P EST LOW 20 MIN: CPT | Mod: 25

## 2019-12-09 NOTE — HISTORY OF PRESENT ILLNESS
[FreeTextEntry8] : Pt presents with two days of urinary frequency and dysuria.\par Patient has taken 3 doses of Cipro already - feels about 20% better

## 2019-12-09 NOTE — REVIEW OF SYSTEMS
[Dysuria] : dysuria [Nocturia] : nocturia [Hesitancy] : no hesitancy [Hematuria] : no hematuria [Frequency] : frequency [Negative] : Gastrointestinal

## 2019-12-09 NOTE — ASSESSMENT
[FreeTextEntry1] : Cipro bid for ten days\par F/U urine C+S, although pt has already been on Cipro.\par \par F/U if symptoms do not resolve, or if they should change/worsen.\par Needs Urology f/u for frequent UTI

## 2019-12-11 LAB — BACTERIA UR CULT: NORMAL

## 2019-12-16 ENCOUNTER — APPOINTMENT (OUTPATIENT)
Dept: INTERNAL MEDICINE | Facility: CLINIC | Age: 69
End: 2019-12-16
Payer: MEDICARE

## 2019-12-16 VITALS — HEIGHT: 67 IN | BODY MASS INDEX: 27.31 KG/M2 | WEIGHT: 174 LBS

## 2019-12-16 LAB
BILIRUB UR QL STRIP: NEGATIVE
CLARITY UR: NORMAL
GLUCOSE UR-MCNC: NEGATIVE
HCG UR QL: 2 EU/DL
HGB UR QL STRIP.AUTO: NEGATIVE
KETONES UR-MCNC: NEGATIVE
LEUKOCYTE ESTERASE UR QL STRIP: NEGATIVE
NITRITE UR QL STRIP: NEGATIVE
PH UR STRIP: 5.5
PROT UR STRIP-MCNC: NEGATIVE
SP GR UR STRIP: 1.01

## 2019-12-16 PROCEDURE — 99213 OFFICE O/P EST LOW 20 MIN: CPT | Mod: 25

## 2019-12-16 PROCEDURE — 81003 URINALYSIS AUTO W/O SCOPE: CPT | Mod: QW

## 2019-12-20 NOTE — PHYSICAL EXAM
[Normal] : soft, non-tender, non-distended, no masses palpated, no HSM and normal bowel sounds [No CVA Tenderness] : no CVA  tenderness

## 2019-12-20 NOTE — HISTORY OF PRESENT ILLNESS
[FreeTextEntry8] : Pt presents with continued urinary frequency and dysuria.\par Patient has taken almost ten days of Cipro - feels better, but still with frequency.\par ?prostatitis.\par \par NO fever/chills.\par Urine culture was negative, but patient was already on cipro.\par

## 2019-12-20 NOTE — ASSESSMENT
[FreeTextEntry1] : Change Cipro to Bactrim daily\par F/U urine C+S, although pt has already been on Cipro.\par \par F/U if symptoms do not resolve, or if they should change/worsen.\par Needs Urology f/u for frequent UTI

## 2019-12-23 ENCOUNTER — TRANSCRIPTION ENCOUNTER (OUTPATIENT)
Age: 69
End: 2019-12-23

## 2020-02-17 ENCOUNTER — TRANSCRIPTION ENCOUNTER (OUTPATIENT)
Age: 70
End: 2020-02-17

## 2020-03-27 ENCOUNTER — RX RENEWAL (OUTPATIENT)
Age: 70
End: 2020-03-27

## 2020-08-31 ENCOUNTER — RX RENEWAL (OUTPATIENT)
Age: 70
End: 2020-08-31

## 2020-11-12 ENCOUNTER — TRANSCRIPTION ENCOUNTER (OUTPATIENT)
Age: 70
End: 2020-11-12

## 2020-11-18 ENCOUNTER — LABORATORY RESULT (OUTPATIENT)
Age: 70
End: 2020-11-18

## 2020-11-18 ENCOUNTER — APPOINTMENT (OUTPATIENT)
Dept: INTERNAL MEDICINE | Facility: CLINIC | Age: 70
End: 2020-11-18
Payer: MEDICARE

## 2020-11-18 VITALS
DIASTOLIC BLOOD PRESSURE: 80 MMHG | HEIGHT: 67 IN | TEMPERATURE: 98.2 F | SYSTOLIC BLOOD PRESSURE: 130 MMHG | WEIGHT: 162 LBS | BODY MASS INDEX: 25.43 KG/M2

## 2020-11-18 DIAGNOSIS — N39.0 URINARY TRACT INFECTION, SITE NOT SPECIFIED: ICD-10-CM

## 2020-11-18 DIAGNOSIS — Z23 ENCOUNTER FOR IMMUNIZATION: ICD-10-CM

## 2020-11-18 DIAGNOSIS — Z92.29 PERSONAL HISTORY OF OTHER DRUG THERAPY: ICD-10-CM

## 2020-11-18 PROCEDURE — 99214 OFFICE O/P EST MOD 30 MIN: CPT | Mod: 25

## 2020-11-18 PROCEDURE — 36415 COLL VENOUS BLD VENIPUNCTURE: CPT

## 2020-11-18 PROCEDURE — 96372 THER/PROPH/DIAG INJ SC/IM: CPT

## 2020-11-18 PROCEDURE — G0008: CPT

## 2020-11-18 PROCEDURE — 90662 IIV NO PRSV INCREASED AG IM: CPT

## 2020-11-18 RX ORDER — SULFAMETHOXAZOLE AND TRIMETHOPRIM 800; 160 MG/1; MG/1
800-160 TABLET ORAL TWICE DAILY
Qty: 28 | Refills: 0 | Status: DISCONTINUED | COMMUNITY
Start: 2019-12-16 | End: 2020-11-18

## 2020-11-18 RX ORDER — CYANOCOBALAMIN 1000 UG/ML
1000 INJECTION INTRAMUSCULAR; SUBCUTANEOUS
Qty: 0 | Refills: 0 | Status: COMPLETED | OUTPATIENT
Start: 2020-11-18

## 2020-11-18 RX ORDER — CEPHALEXIN 250 MG/1
250 CAPSULE ORAL 4 TIMES DAILY
Qty: 28 | Refills: 0 | Status: DISCONTINUED | COMMUNITY
Start: 2020-11-18 | End: 2020-11-18

## 2020-11-18 RX ORDER — CIPROFLOXACIN HYDROCHLORIDE 500 MG/1
500 TABLET, FILM COATED ORAL TWICE DAILY
Qty: 28 | Refills: 0 | Status: DISCONTINUED | COMMUNITY
Start: 2019-10-29 | End: 2020-11-18

## 2020-11-18 RX ORDER — CIPROFLOXACIN HYDROCHLORIDE 500 MG/1
500 TABLET, FILM COATED ORAL TWICE DAILY
Qty: 20 | Refills: 0 | Status: DISCONTINUED | COMMUNITY
Start: 2019-12-09 | End: 2020-11-18

## 2020-11-18 RX ADMIN — CYANOCOBALAMIN 0 MCG/ML: 1000 INJECTION, SOLUTION INTRAMUSCULAR at 00:00

## 2020-11-23 ENCOUNTER — NON-APPOINTMENT (OUTPATIENT)
Age: 70
End: 2020-11-23

## 2020-11-23 LAB
FOLATE SERPL-MCNC: 15.6 NG/ML
IRON SATN MFR SERPL: 28 %
IRON SERPL-MCNC: 89 UG/DL
PSA SERPL-MCNC: 3.51 NG/ML
TIBC SERPL-MCNC: 312 UG/DL
UIBC SERPL-MCNC: 223 UG/DL
VIT B12 SERPL-MCNC: >2000 PG/ML

## 2020-11-25 ENCOUNTER — NON-APPOINTMENT (OUTPATIENT)
Age: 70
End: 2020-11-25

## 2020-11-25 LAB — FERRITIN SERPL-MCNC: 514 NG/ML

## 2020-11-25 RX ORDER — CEFDINIR 300 MG/1
300 CAPSULE ORAL
Qty: 20 | Refills: 0 | Status: DISCONTINUED | COMMUNITY
Start: 2020-11-18 | End: 2020-11-25

## 2020-12-01 LAB
25(OH)D3 SERPL-MCNC: 55.5 NG/ML
ALBUMIN SERPL ELPH-MCNC: 3.9 G/DL
ALP BLD-CCNC: 65 U/L
ALT SERPL-CCNC: 79 U/L
ANION GAP SERPL CALC-SCNC: 13 MMOL/L
AST SERPL-CCNC: 129 U/L
BASOPHILS # BLD AUTO: 0.06 K/UL
BASOPHILS NFR BLD AUTO: 2.2 %
BILIRUB SERPL-MCNC: 0.8 MG/DL
BUN SERPL-MCNC: 5 MG/DL
CALCIUM SERPL-MCNC: 9.7 MG/DL
CHLORIDE SERPL-SCNC: 104 MMOL/L
CHOLEST SERPL-MCNC: 177 MG/DL
CO2 SERPL-SCNC: 26 MMOL/L
CREAT SERPL-MCNC: 1.03 MG/DL
EOSINOPHIL # BLD AUTO: 0.15 K/UL
EOSINOPHIL NFR BLD AUTO: 5.4 %
ESTIMATED AVERAGE GLUCOSE: 97 MG/DL
GLUCOSE SERPL-MCNC: 118 MG/DL
HBA1C MFR BLD HPLC: 5 %
HCT VFR BLD CALC: 33.3 %
HDLC SERPL-MCNC: 66 MG/DL
HGB BLD-MCNC: 11 G/DL
IMM GRANULOCYTES NFR BLD AUTO: 0.4 %
LDLC SERPL CALC-MCNC: 79 MG/DL
LYMPHOCYTES # BLD AUTO: 1.56 K/UL
LYMPHOCYTES NFR BLD AUTO: 55.9 %
MAN DIFF?: NORMAL
MCHC RBC-ENTMCNC: 32.4 PG
MCHC RBC-ENTMCNC: 33 GM/DL
MCV RBC AUTO: 97.9 FL
MONOCYTES # BLD AUTO: 0.52 K/UL
MONOCYTES NFR BLD AUTO: 18.6 %
NEUTROPHILS # BLD AUTO: 0.49 K/UL
NEUTROPHILS NFR BLD AUTO: 17.5 %
NONHDLC SERPL-MCNC: 111 MG/DL
PLATELET # BLD AUTO: 265 K/UL
POTASSIUM SERPL-SCNC: 4.3 MMOL/L
PROT SERPL-MCNC: 6.5 G/DL
RBC # BLD: 3.4 M/UL
RBC # FLD: 15.6 %
SODIUM SERPL-SCNC: 143 MMOL/L
TRIGL SERPL-MCNC: 156 MG/DL
TSH SERPL-ACNC: 9.67 UIU/ML
WBC # FLD AUTO: 2.79 K/UL

## 2020-12-04 ENCOUNTER — NON-APPOINTMENT (OUTPATIENT)
Age: 70
End: 2020-12-04

## 2020-12-04 DIAGNOSIS — Z87.898 PERSONAL HISTORY OF OTHER SPECIFIED CONDITIONS: ICD-10-CM

## 2020-12-04 DIAGNOSIS — R30.0 DYSURIA: ICD-10-CM

## 2020-12-08 ENCOUNTER — TRANSCRIPTION ENCOUNTER (OUTPATIENT)
Age: 70
End: 2020-12-08

## 2020-12-11 ENCOUNTER — TRANSCRIPTION ENCOUNTER (OUTPATIENT)
Age: 70
End: 2020-12-11

## 2020-12-11 LAB
APPEARANCE: CLEAR
BACTERIA UR CULT: NORMAL
BACTERIA: NEGATIVE
BILIRUBIN URINE: NEGATIVE
BLOOD URINE: NEGATIVE
COLOR: NORMAL
GLUCOSE QUALITATIVE U: NEGATIVE
HYALINE CASTS: 0 /LPF
KETONES URINE: NEGATIVE
LEUKOCYTE ESTERASE URINE: NEGATIVE
MICROSCOPIC-UA: NORMAL
NITRITE URINE: NEGATIVE
PH URINE: 5
PROTEIN URINE: NEGATIVE
RED BLOOD CELLS URINE: 0 /HPF
SPECIFIC GRAVITY URINE: 1
SQUAMOUS EPITHELIAL CELLS: 0 /HPF
UROBILINOGEN URINE: NORMAL
WHITE BLOOD CELLS URINE: 0 /HPF

## 2020-12-14 ENCOUNTER — APPOINTMENT (OUTPATIENT)
Dept: INTERNAL MEDICINE | Facility: CLINIC | Age: 70
End: 2020-12-14
Payer: MEDICARE

## 2020-12-14 PROCEDURE — 36415 COLL VENOUS BLD VENIPUNCTURE: CPT

## 2020-12-15 ENCOUNTER — NON-APPOINTMENT (OUTPATIENT)
Age: 70
End: 2020-12-15

## 2020-12-15 LAB
APPEARANCE: CLEAR
BILIRUBIN URINE: NEGATIVE
BLOOD URINE: NEGATIVE
COLOR: YELLOW
GLUCOSE QUALITATIVE U: NEGATIVE
KETONES URINE: NEGATIVE
LEUKOCYTE ESTERASE URINE: NEGATIVE
NITRITE URINE: NEGATIVE
PH URINE: 5.5
PROTEIN URINE: NEGATIVE
PSA SERPL-MCNC: 0.76 NG/ML
SPECIFIC GRAVITY URINE: 1.02
UROBILINOGEN URINE: NORMAL

## 2020-12-21 ENCOUNTER — NON-APPOINTMENT (OUTPATIENT)
Age: 70
End: 2020-12-21

## 2020-12-21 ENCOUNTER — APPOINTMENT (OUTPATIENT)
Dept: INTERNAL MEDICINE | Facility: CLINIC | Age: 70
End: 2020-12-21
Payer: MEDICARE

## 2020-12-21 VITALS
OXYGEN SATURATION: 98 % | HEIGHT: 67 IN | BODY MASS INDEX: 25.43 KG/M2 | WEIGHT: 162 LBS | HEART RATE: 99 BPM | DIASTOLIC BLOOD PRESSURE: 90 MMHG | SYSTOLIC BLOOD PRESSURE: 140 MMHG | TEMPERATURE: 97.2 F

## 2020-12-21 DIAGNOSIS — N39.0 URINARY TRACT INFECTION, SITE NOT SPECIFIED: ICD-10-CM

## 2020-12-21 DIAGNOSIS — E87.1 HYPO-OSMOLALITY AND HYPONATREMIA: ICD-10-CM

## 2020-12-21 LAB
25(OH)D3 SERPL-MCNC: 44 NG/ML
ALBUMIN SERPL ELPH-MCNC: 4.2 G/DL
ALP BLD-CCNC: 56 U/L
ALT SERPL-CCNC: 21 U/L
ANION GAP SERPL CALC-SCNC: 10 MMOL/L
AST SERPL-CCNC: 41 U/L
BASOPHILS # BLD AUTO: 0.07 K/UL
BASOPHILS NFR BLD AUTO: 1.8 %
BILIRUB SERPL-MCNC: 0.4 MG/DL
BUN SERPL-MCNC: 15 MG/DL
CALCIUM SERPL-MCNC: 9.9 MG/DL
CHLORIDE SERPL-SCNC: 107 MMOL/L
CHOLEST SERPL-MCNC: 205 MG/DL
CO2 SERPL-SCNC: 26 MMOL/L
CREAT SERPL-MCNC: 1.12 MG/DL
CREAT SPEC-SCNC: 151 MG/DL
EOSINOPHIL # BLD AUTO: 0.38 K/UL
EOSINOPHIL NFR BLD AUTO: 9.7 %
ESTIMATED AVERAGE GLUCOSE: 88 MG/DL
GLUCOSE SERPL-MCNC: 128 MG/DL
HBA1C MFR BLD HPLC: 4.7 %
HCT VFR BLD CALC: 36.3 %
HDLC SERPL-MCNC: 58 MG/DL
HGB BLD-MCNC: 11.6 G/DL
IMM GRANULOCYTES NFR BLD AUTO: 0 %
LDLC SERPL CALC-MCNC: 120 MG/DL
LYMPHOCYTES # BLD AUTO: 1.69 K/UL
LYMPHOCYTES NFR BLD AUTO: 43 %
MAN DIFF?: NORMAL
MCHC RBC-ENTMCNC: 32 GM/DL
MCHC RBC-ENTMCNC: 32.9 PG
MCV RBC AUTO: 102.8 FL
MICROALBUMIN 24H UR DL<=1MG/L-MCNC: 3.4 MG/DL
MICROALBUMIN/CREAT 24H UR-RTO: 23 MG/G
MONOCYTES # BLD AUTO: 0.32 K/UL
MONOCYTES NFR BLD AUTO: 8.1 %
NEUTROPHILS # BLD AUTO: 1.47 K/UL
NEUTROPHILS NFR BLD AUTO: 37.4 %
NONHDLC SERPL-MCNC: 147 MG/DL
PLATELET # BLD AUTO: 251 K/UL
POTASSIUM SERPL-SCNC: 4.5 MMOL/L
PROT SERPL-MCNC: 6.8 G/DL
RBC # BLD: 3.53 M/UL
RBC # FLD: 14.2 %
SODIUM SERPL-SCNC: 142 MMOL/L
T4 SERPL-MCNC: 6 UG/DL
TRIGL SERPL-MCNC: 134 MG/DL
TSH SERPL-ACNC: 8.17 UIU/ML
WBC # FLD AUTO: 3.93 K/UL

## 2020-12-21 PROCEDURE — 93000 ELECTROCARDIOGRAM COMPLETE: CPT

## 2020-12-21 PROCEDURE — G0439: CPT

## 2020-12-21 RX ORDER — SULFAMETHOXAZOLE AND TRIMETHOPRIM 800; 160 MG/1; MG/1
800-160 TABLET ORAL
Qty: 20 | Refills: 0 | Status: DISCONTINUED | COMMUNITY
Start: 2020-11-25 | End: 2020-12-21

## 2020-12-21 RX ORDER — CEFUROXIME AXETIL 500 MG/1
500 TABLET ORAL TWICE DAILY
Qty: 20 | Refills: 0 | Status: DISCONTINUED | COMMUNITY
Start: 2020-12-04 | End: 2020-12-21

## 2020-12-21 RX ORDER — CYCLOBENZAPRINE HYDROCHLORIDE 10 MG/1
10 TABLET, FILM COATED ORAL DAILY
Qty: 7 | Refills: 0 | Status: DISCONTINUED | COMMUNITY
Start: 2018-05-01 | End: 2020-12-21

## 2020-12-23 ENCOUNTER — TRANSCRIPTION ENCOUNTER (OUTPATIENT)
Age: 70
End: 2020-12-23

## 2021-01-19 ENCOUNTER — APPOINTMENT (OUTPATIENT)
Dept: INTERNAL MEDICINE | Facility: CLINIC | Age: 71
End: 2021-01-19
Payer: MEDICARE

## 2021-01-19 VITALS
HEIGHT: 67 IN | BODY MASS INDEX: 27.78 KG/M2 | DIASTOLIC BLOOD PRESSURE: 80 MMHG | WEIGHT: 177 LBS | TEMPERATURE: 98 F | SYSTOLIC BLOOD PRESSURE: 130 MMHG

## 2021-01-19 DIAGNOSIS — M25.551 PAIN IN RIGHT HIP: ICD-10-CM

## 2021-01-19 DIAGNOSIS — Z01.818 ENCOUNTER FOR OTHER PREPROCEDURAL EXAMINATION: ICD-10-CM

## 2021-01-19 PROCEDURE — 99213 OFFICE O/P EST LOW 20 MIN: CPT

## 2021-02-20 ENCOUNTER — TRANSCRIPTION ENCOUNTER (OUTPATIENT)
Age: 71
End: 2021-02-20

## 2021-02-27 ENCOUNTER — RX RENEWAL (OUTPATIENT)
Age: 71
End: 2021-02-27

## 2021-06-01 ENCOUNTER — RX RENEWAL (OUTPATIENT)
Age: 71
End: 2021-06-01

## 2021-07-26 ENCOUNTER — TRANSCRIPTION ENCOUNTER (OUTPATIENT)
Age: 71
End: 2021-07-26

## 2021-07-26 ENCOUNTER — RX RENEWAL (OUTPATIENT)
Age: 71
End: 2021-07-26

## 2021-07-30 ENCOUNTER — LABORATORY RESULT (OUTPATIENT)
Age: 71
End: 2021-07-30

## 2021-07-30 ENCOUNTER — APPOINTMENT (OUTPATIENT)
Dept: INTERNAL MEDICINE | Facility: CLINIC | Age: 71
End: 2021-07-30
Payer: MEDICARE

## 2021-07-30 VITALS
WEIGHT: 170 LBS | SYSTOLIC BLOOD PRESSURE: 120 MMHG | HEIGHT: 67 IN | HEART RATE: 102 BPM | BODY MASS INDEX: 26.68 KG/M2 | TEMPERATURE: 97.5 F | DIASTOLIC BLOOD PRESSURE: 60 MMHG

## 2021-07-30 DIAGNOSIS — R79.89 OTHER SPECIFIED ABNORMAL FINDINGS OF BLOOD CHEMISTRY: ICD-10-CM

## 2021-07-30 PROCEDURE — 99214 OFFICE O/P EST MOD 30 MIN: CPT | Mod: 25

## 2021-07-30 PROCEDURE — 36415 COLL VENOUS BLD VENIPUNCTURE: CPT

## 2021-07-31 ENCOUNTER — TRANSCRIPTION ENCOUNTER (OUTPATIENT)
Age: 71
End: 2021-07-31

## 2021-07-31 LAB
ALBUMIN SERPL ELPH-MCNC: 4.4 G/DL
ALP BLD-CCNC: 62 U/L
ALT SERPL-CCNC: 403 U/L
ANION GAP SERPL CALC-SCNC: 23 MMOL/L
AST SERPL-CCNC: 272 U/L
BILIRUB SERPL-MCNC: 0.6 MG/DL
BUN SERPL-MCNC: 8 MG/DL
CALCIUM SERPL-MCNC: 9.7 MG/DL
CHLORIDE SERPL-SCNC: 100 MMOL/L
CO2 SERPL-SCNC: 19 MMOL/L
CREAT SERPL-MCNC: 1.25 MG/DL
GLUCOSE SERPL-MCNC: 86 MG/DL
POTASSIUM SERPL-SCNC: 3.5 MMOL/L
PROT SERPL-MCNC: 7.5 G/DL
SODIUM SERPL-SCNC: 141 MMOL/L

## 2021-07-31 RX ORDER — CEFPODOXIME PROXETIL 200 MG/1
200 TABLET, FILM COATED ORAL TWICE DAILY
Qty: 14 | Refills: 0 | Status: DISCONTINUED | COMMUNITY
Start: 2021-07-30 | End: 2021-07-31

## 2021-08-02 ENCOUNTER — TRANSCRIPTION ENCOUNTER (OUTPATIENT)
Age: 71
End: 2021-08-02

## 2021-08-02 LAB
BACTERIA UR CULT: NORMAL
CHOLEST SERPL-MCNC: 166 MG/DL
ESTIMATED AVERAGE GLUCOSE: 97 MG/DL
HBA1C MFR BLD HPLC: 5 %
HDLC SERPL-MCNC: 56 MG/DL
LDLC SERPL CALC-MCNC: 74 MG/DL
NONHDLC SERPL-MCNC: 109 MG/DL
TRIGL SERPL-MCNC: 177 MG/DL

## 2021-08-09 ENCOUNTER — APPOINTMENT (OUTPATIENT)
Dept: UROLOGY | Facility: CLINIC | Age: 71
End: 2021-08-09
Payer: MEDICARE

## 2021-08-09 VITALS
BODY MASS INDEX: 26.68 KG/M2 | RESPIRATION RATE: 17 BRPM | HEART RATE: 97 BPM | WEIGHT: 170 LBS | SYSTOLIC BLOOD PRESSURE: 108 MMHG | HEIGHT: 67 IN | DIASTOLIC BLOOD PRESSURE: 73 MMHG | TEMPERATURE: 98 F

## 2021-08-09 DIAGNOSIS — N32.0 BLADDER-NECK OBSTRUCTION: ICD-10-CM

## 2021-08-09 DIAGNOSIS — N39.0 URINARY TRACT INFECTION, SITE NOT SPECIFIED: ICD-10-CM

## 2021-08-09 PROCEDURE — 99203 OFFICE O/P NEW LOW 30 MIN: CPT

## 2021-08-09 NOTE — REVIEW OF SYSTEMS
[see HPI] : see HPI [Poor quality erections] : Poor quality erections [Urine Infection (bladder/kidney)] : bladder/kidney infection [Pain during urination] : pain during urination [Told you have blood in urine on a urine test] : told blood was present in a urine test [Wake up at night to urinate  How many times?  ___] : wakes up to urinate [unfilled] times during the night [Strong urge to urinate] : strong urge to urinate [Strain or push to urinate] : strain or push to urinate [Slow urine stream] : slow urine stream [Interrupted urine stream] : interrupted urine stream [Bladder fullness after urinating] : bladder fullness after urinating [Leakage of urine with urgency] : leakage of urine with urgency [Unaware of when urine is leaking] : unaware of when urine is leaking [Anxiety] : anxiety [Depression] : depression [Negative] : Psychiatric

## 2021-08-09 NOTE — PHYSICAL EXAM
[General Appearance - Well Developed] : well developed [General Appearance - Well Nourished] : well nourished [Heart Rate And Rhythm] : Heart rate and rhythm were normal [] : no respiratory distress [Respiration, Rhythm And Depth] : normal respiratory rhythm and effort [Bowel Sounds] : normal bowel sounds [Abdomen Soft] : soft [Normal Station and Gait] : the gait and station were normal for the patient's age [Skin Color & Pigmentation] : normal skin color and pigmentation [Skin Turgor] : supple [No Focal Deficits] : no focal deficits [Oriented To Time, Place, And Person] : oriented to person, place, and time [Not Anxious] : not anxious [No Palpable Adenopathy] : no palpable adenopathy [FreeTextEntry1] : <40g, not tender

## 2021-08-09 NOTE — HISTORY OF PRESENT ILLNESS
[Urinary Urgency] : urinary urgency [Urinary Frequency] : urinary frequency [Urinary Incontinence] : no urinary incontinence [FreeTextEntry1] : HPI: Mr. Fernando is a 71 yo M with a hx of recurrent UTIs going on for 8-9 years. Has had prior TURP 2014, recently scheduled for Jan 2020 at [facility] (Kelly Hastings was physician). She had received gentamicin and other antibiotics IM which had helped. Symptoms of burning urination, frequency 5-6 times a day, nocturia 3-4x a day, occasional leakage. Three-4 weeks ago had fever to 102.5, urine culture was negative, prescribed vantin for 3 days. Saw PCP - given cipro 500 mg BID; still has persistent burning.  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

## 2021-08-10 LAB
APPEARANCE: CLEAR
BACTERIA: NEGATIVE
BILIRUBIN URINE: NEGATIVE
BLOOD URINE: NEGATIVE
COLOR: NORMAL
GLUCOSE QUALITATIVE U: NEGATIVE
HYALINE CASTS: 1 /LPF
KETONES URINE: NEGATIVE
LEUKOCYTE ESTERASE URINE: NEGATIVE
MICROSCOPIC-UA: NORMAL
NITRITE URINE: NEGATIVE
PH URINE: 6
PROTEIN URINE: NORMAL
RED BLOOD CELLS URINE: 1 /HPF
SPECIFIC GRAVITY URINE: 1.02
SQUAMOUS EPITHELIAL CELLS: 1 /HPF
UROBILINOGEN URINE: NORMAL
WHITE BLOOD CELLS URINE: 3 /HPF

## 2021-08-11 ENCOUNTER — TRANSCRIPTION ENCOUNTER (OUTPATIENT)
Age: 71
End: 2021-08-11

## 2021-08-11 LAB
APPEARANCE: CLEAR
BACTERIA UR CULT: NORMAL
BACTERIA: NEGATIVE
BILIRUBIN URINE: NEGATIVE
BLOOD URINE: NEGATIVE
COLOR: YELLOW
GLUCOSE QUALITATIVE U: NEGATIVE
HYALINE CASTS: 8 /LPF
KETONES URINE: NEGATIVE
LEUKOCYTE ESTERASE URINE: ABNORMAL
MICROSCOPIC-UA: NORMAL
NITRITE URINE: NEGATIVE
PH URINE: 6
PROTEIN URINE: ABNORMAL
RED BLOOD CELLS URINE: 2 /HPF
SPECIFIC GRAVITY URINE: 1.01
SQUAMOUS EPITHELIAL CELLS: 2 /HPF
UROBILINOGEN URINE: NORMAL
WHITE BLOOD CELLS URINE: 44 /HPF

## 2021-08-26 ENCOUNTER — APPOINTMENT (OUTPATIENT)
Dept: INTERNAL MEDICINE | Facility: CLINIC | Age: 71
End: 2021-08-26
Payer: MEDICARE

## 2021-08-26 PROCEDURE — 36415 COLL VENOUS BLD VENIPUNCTURE: CPT

## 2021-09-03 ENCOUNTER — NON-APPOINTMENT (OUTPATIENT)
Age: 71
End: 2021-09-03

## 2021-09-03 LAB
FERRITIN SERPL-MCNC: 140 NG/ML
TSH SERPL-ACNC: 8.89 UIU/ML

## 2021-09-03 RX ORDER — CIPROFLOXACIN HYDROCHLORIDE 500 MG/1
500 TABLET, FILM COATED ORAL
Qty: 6 | Refills: 0 | Status: DISCONTINUED | COMMUNITY
Start: 2021-07-31 | End: 2021-09-03

## 2021-09-04 LAB
BASOPHILS # BLD AUTO: 0.06 K/UL
BASOPHILS NFR BLD AUTO: 1.4 %
EOSINOPHIL # BLD AUTO: 0.29 K/UL
EOSINOPHIL NFR BLD AUTO: 7 %
FOLATE SERPL-MCNC: 18.8 NG/ML
HCT VFR BLD CALC: 35.9 %
HGB BLD-MCNC: 11.7 G/DL
IMM GRANULOCYTES NFR BLD AUTO: 0.5 %
IRON SATN MFR SERPL: 15 %
IRON SERPL-MCNC: 54 UG/DL
LYMPHOCYTES # BLD AUTO: 1.72 K/UL
LYMPHOCYTES NFR BLD AUTO: 41.3 %
MAN DIFF?: NORMAL
MCHC RBC-ENTMCNC: 32.1 PG
MCHC RBC-ENTMCNC: 32.6 GM/DL
MCV RBC AUTO: 98.6 FL
MONOCYTES # BLD AUTO: 0.71 K/UL
MONOCYTES NFR BLD AUTO: 17.1 %
NEUTROPHILS # BLD AUTO: 1.36 K/UL
NEUTROPHILS NFR BLD AUTO: 32.7 %
PLATELET # BLD AUTO: 201 K/UL
RBC # BLD: 3.64 M/UL
RBC # FLD: 17.3 %
TIBC SERPL-MCNC: 366 UG/DL
UIBC SERPL-MCNC: 312 UG/DL
VIT B12 SERPL-MCNC: 1539 PG/ML
WBC # FLD AUTO: 4.16 K/UL

## 2021-09-20 ENCOUNTER — APPOINTMENT (OUTPATIENT)
Dept: UROLOGY | Facility: CLINIC | Age: 71
End: 2021-09-20

## 2021-10-06 PROBLEM — N32.0 BLADDER OUTLET OBSTRUCTION: Status: ACTIVE | Noted: 2021-08-09

## 2021-11-18 ENCOUNTER — RX RENEWAL (OUTPATIENT)
Age: 71
End: 2021-11-18

## 2021-11-19 ENCOUNTER — RX RENEWAL (OUTPATIENT)
Age: 71
End: 2021-11-19

## 2021-12-02 ENCOUNTER — APPOINTMENT (OUTPATIENT)
Dept: UROLOGY | Facility: CLINIC | Age: 71
End: 2021-12-02
Payer: MEDICARE

## 2021-12-02 VITALS
HEIGHT: 67 IN | WEIGHT: 167 LBS | SYSTOLIC BLOOD PRESSURE: 102 MMHG | DIASTOLIC BLOOD PRESSURE: 61 MMHG | BODY MASS INDEX: 26.21 KG/M2 | TEMPERATURE: 98 F | HEART RATE: 108 BPM

## 2021-12-02 DIAGNOSIS — N40.0 BENIGN PROSTATIC HYPERPLASIA WITHOUT LOWER URINARY TRACT SYMPMS: ICD-10-CM

## 2021-12-02 PROCEDURE — 99213 OFFICE O/P EST LOW 20 MIN: CPT

## 2021-12-02 NOTE — REVIEW OF SYSTEMS
[see HPI] : see HPI [Poor quality erections] : Poor quality erections [Urine Infection (bladder/kidney)] : bladder/kidney infection [Pain during urination] : pain during urination [Told you have blood in urine on a urine test] : told blood was present in a urine test [Wake up at night to urinate  How many times?  ___] : wakes up to urinate [unfilled] times during the night [Strong urge to urinate] : strong urge to urinate [Strain or push to urinate] : strain or push to urinate [Slow urine stream] : slow urine stream [Interrupted urine stream] : interrupted urine stream [Bladder fullness after urinating] : bladder fullness after urinating [Leakage of urine with urgency] : leakage of urine with urgency [Unaware of when urine is leaking] : unaware of when urine is leaking [Anxiety] : anxiety [Depression] : depression [Negative] : Heme/Lymph

## 2021-12-05 ENCOUNTER — TRANSCRIPTION ENCOUNTER (OUTPATIENT)
Age: 71
End: 2021-12-05

## 2021-12-05 LAB — BACTERIA UR CULT: ABNORMAL

## 2021-12-05 NOTE — ASSESSMENT
[FreeTextEntry1] : 71 yo M with acute on chronic prostatitis here today with 3 days of discomfort and urinary burning

## 2021-12-05 NOTE — HISTORY OF PRESENT ILLNESS
[Urinary Urgency] : urinary urgency [Urinary Frequency] : urinary frequency [FreeTextEntry1] : HPI: Mr. Fernando is a 71 yo M with a hx of recurrent UTIs going on for 8-9 years. Has had prior TURP 2014, recently scheduled for Jan 2020 at [place] (Kelly Hastings was physician). She had received gentamicin and other antibiotics IM which had helped. Symptoms of burning urination, frequency 5-6 times a day, nocturia 3-4x a day, occasional leakage. Three-4 weeks ago had fever to 102.5, urine culture was negative, prescribed vantin for 3 days. Saw PCP - given cipro 500 mg BID; still has persistent burning.  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 No symptoms for 2 months and 100% improved. Three days ago had urinary burning and discomfort. Will give UCx today. Wants to retry vantin 100 mg BID x 4 weeks. [Urinary Incontinence] : no urinary incontinence [Urinary Retention] : no urinary retention

## 2021-12-05 NOTE — PHYSICAL EXAM
[General Appearance - Well Developed] : well developed [General Appearance - Well Nourished] : well nourished [Bowel Sounds] : normal bowel sounds [Abdomen Soft] : soft [Skin Color & Pigmentation] : normal skin color and pigmentation [Skin Turgor] : supple [Heart Rate And Rhythm] : Heart rate and rhythm were normal [] : no respiratory distress [Respiration, Rhythm And Depth] : normal respiratory rhythm and effort [Oriented To Time, Place, And Person] : oriented to person, place, and time [Not Anxious] : not anxious [Normal Station and Gait] : the gait and station were normal for the patient's age [No Focal Deficits] : no focal deficits [No Palpable Adenopathy] : no palpable adenopathy [FreeTextEntry1] : <40g, not tender

## 2022-01-04 ENCOUNTER — RX RENEWAL (OUTPATIENT)
Age: 72
End: 2022-01-04

## 2022-03-16 ENCOUNTER — RX RENEWAL (OUTPATIENT)
Age: 72
End: 2022-03-16

## 2022-03-22 NOTE — ED ADULT NURSE NOTE - NSFALLRSKHARMRISK_ED_ALL_ED
CASE MANAGEMENT DISCHARGE SUMMARY:    DISCHARGE DATE: 03/22/22    DISCHARGED TO HOME     TRANSPORTATION:              TIME: at bedside    Just assessed pt, denied needs;  at bedside & will take her home.     Electronically signed by Ariella Dominguez RN on 3/22/2022 at 2:16 PM yes

## 2022-03-23 ENCOUNTER — MED ADMIN CHARGE (OUTPATIENT)
Age: 72
End: 2022-03-23

## 2022-03-23 ENCOUNTER — APPOINTMENT (OUTPATIENT)
Dept: INTERNAL MEDICINE | Facility: CLINIC | Age: 72
End: 2022-03-23
Payer: MEDICARE

## 2022-03-23 VITALS
HEIGHT: 67 IN | WEIGHT: 165 LBS | BODY MASS INDEX: 25.9 KG/M2 | TEMPERATURE: 97.1 F | SYSTOLIC BLOOD PRESSURE: 120 MMHG | DIASTOLIC BLOOD PRESSURE: 70 MMHG

## 2022-03-23 DIAGNOSIS — Z87.438 PERSONAL HISTORY OF OTHER DISEASES OF MALE GENITAL ORGANS: ICD-10-CM

## 2022-03-23 DIAGNOSIS — E03.9 HYPOTHYROIDISM, UNSPECIFIED: ICD-10-CM

## 2022-03-23 PROCEDURE — 96372 THER/PROPH/DIAG INJ SC/IM: CPT

## 2022-03-23 PROCEDURE — 99214 OFFICE O/P EST MOD 30 MIN: CPT | Mod: 25

## 2022-03-23 PROCEDURE — 36415 COLL VENOUS BLD VENIPUNCTURE: CPT

## 2022-03-23 RX ORDER — CEFPODOXIME PROXETIL 100 MG/1
100 TABLET, FILM COATED ORAL
Qty: 120 | Refills: 11 | Status: DISCONTINUED | COMMUNITY
Start: 2021-12-02 | End: 2022-03-23

## 2022-03-23 RX ORDER — TAMSULOSIN HYDROCHLORIDE 0.4 MG/1
0.4 CAPSULE ORAL
Qty: 30 | Refills: 0 | Status: DISCONTINUED | COMMUNITY
Start: 2020-12-09 | End: 2022-03-23

## 2022-03-23 RX ORDER — CYANOCOBALAMIN 1000 UG/ML
1000 INJECTION INTRAMUSCULAR; SUBCUTANEOUS
Qty: 0 | Refills: 0 | Status: COMPLETED | OUTPATIENT
Start: 2022-03-23

## 2022-03-23 RX ORDER — DOXYCYCLINE HYCLATE 100 MG/1
100 CAPSULE ORAL
Qty: 90 | Refills: 7 | Status: DISCONTINUED | COMMUNITY
Start: 2021-08-09 | End: 2022-03-23

## 2022-03-23 RX ADMIN — CYANOCOBALAMIN 0 MCG/ML: 1000 INJECTION INTRAMUSCULAR; SUBCUTANEOUS at 00:00

## 2022-03-25 LAB
APPEARANCE: CLEAR
BILIRUBIN URINE: NEGATIVE
BLOOD URINE: NEGATIVE
CHOLEST SERPL-MCNC: 170 MG/DL
COLOR: NORMAL
CREAT SPEC-SCNC: 72 MG/DL
ESTIMATED AVERAGE GLUCOSE: 91 MG/DL
FOLATE SERPL-MCNC: 11.9 NG/ML
GLUCOSE QUALITATIVE U: NEGATIVE
HBA1C MFR BLD HPLC: 4.8 %
HDLC SERPL-MCNC: 85 MG/DL
KETONES URINE: NEGATIVE
LDLC SERPL CALC-MCNC: 56 MG/DL
LEUKOCYTE ESTERASE URINE: NEGATIVE
MICROALBUMIN 24H UR DL<=1MG/L-MCNC: <1.2 MG/DL
MICROALBUMIN/CREAT 24H UR-RTO: NORMAL MG/G
NITRITE URINE: NEGATIVE
NONHDLC SERPL-MCNC: 84 MG/DL
PH URINE: 5
PROTEIN URINE: NEGATIVE
PSA SERPL-MCNC: 0.98 NG/ML
SPECIFIC GRAVITY URINE: 1.01
TRIGL SERPL-MCNC: 143 MG/DL
UROBILINOGEN URINE: NORMAL
VIT B12 SERPL-MCNC: 1800 PG/ML

## 2022-03-29 ENCOUNTER — NON-APPOINTMENT (OUTPATIENT)
Age: 72
End: 2022-03-29

## 2022-03-29 LAB
ALBUMIN SERPL ELPH-MCNC: 4.8 G/DL
ALP BLD-CCNC: 39 U/L
ALT SERPL-CCNC: 25 U/L
ANION GAP SERPL CALC-SCNC: 19 MMOL/L
AST SERPL-CCNC: 34 U/L
BASOPHILS # BLD AUTO: 0.08 K/UL
BASOPHILS NFR BLD AUTO: 1.8 %
BILIRUB SERPL-MCNC: 0.5 MG/DL
BUN SERPL-MCNC: 16 MG/DL
CALCIUM SERPL-MCNC: 9.7 MG/DL
CHLORIDE SERPL-SCNC: 105 MMOL/L
CO2 SERPL-SCNC: 22 MMOL/L
CREAT SERPL-MCNC: 1.43 MG/DL
EGFR: 53 ML/MIN/1.73M2
EOSINOPHIL # BLD AUTO: 0.32 K/UL
EOSINOPHIL NFR BLD AUTO: 7.2 %
FERRITIN SERPL-MCNC: 132 NG/ML
GLUCOSE SERPL-MCNC: 99 MG/DL
HCT VFR BLD CALC: 35.7 %
HGB BLD-MCNC: 11.5 G/DL
IMM GRANULOCYTES NFR BLD AUTO: 0 %
IRON SATN MFR SERPL: 16 %
IRON SERPL-MCNC: 56 UG/DL
LYMPHOCYTES # BLD AUTO: 2.23 K/UL
LYMPHOCYTES NFR BLD AUTO: 50 %
MAN DIFF?: NORMAL
MCHC RBC-ENTMCNC: 30.1 PG
MCHC RBC-ENTMCNC: 32.2 GM/DL
MCV RBC AUTO: 93.5 FL
MONOCYTES # BLD AUTO: 0.46 K/UL
MONOCYTES NFR BLD AUTO: 10.3 %
NEUTROPHILS # BLD AUTO: 1.37 K/UL
NEUTROPHILS NFR BLD AUTO: 30.7 %
PLATELET # BLD AUTO: 208 K/UL
POTASSIUM SERPL-SCNC: 4.7 MMOL/L
PROT SERPL-MCNC: 7.3 G/DL
RBC # BLD: 3.82 M/UL
RBC # FLD: 15.5 %
SODIUM SERPL-SCNC: 146 MMOL/L
TIBC SERPL-MCNC: 358 UG/DL
TSH SERPL-ACNC: 4.61 UIU/ML
UIBC SERPL-MCNC: 301 UG/DL
WBC # FLD AUTO: 4.46 K/UL

## 2022-04-05 ENCOUNTER — APPOINTMENT (OUTPATIENT)
Dept: DERMATOLOGY | Facility: CLINIC | Age: 72
End: 2022-04-05
Payer: MEDICARE

## 2022-04-05 VITALS — WEIGHT: 165 LBS | HEIGHT: 67 IN | BODY MASS INDEX: 25.9 KG/M2

## 2022-04-05 DIAGNOSIS — L85.3 XEROSIS CUTIS: ICD-10-CM

## 2022-04-05 PROCEDURE — 99204 OFFICE O/P NEW MOD 45 MIN: CPT

## 2022-04-05 RX ORDER — TRIAMCINOLONE ACETONIDE 1 MG/G
0.1 OINTMENT TOPICAL
Qty: 1 | Refills: 1 | Status: ACTIVE | COMMUNITY
Start: 2022-04-05 | End: 1900-01-01

## 2022-04-19 ENCOUNTER — LABORATORY RESULT (OUTPATIENT)
Age: 72
End: 2022-04-19

## 2022-04-19 ENCOUNTER — APPOINTMENT (OUTPATIENT)
Dept: DERMATOLOGY | Facility: CLINIC | Age: 72
End: 2022-04-19
Payer: MEDICARE

## 2022-04-19 DIAGNOSIS — L30.9 DERMATITIS, UNSPECIFIED: ICD-10-CM

## 2022-04-19 DIAGNOSIS — L29.9 PRURITUS, UNSPECIFIED: ICD-10-CM

## 2022-04-19 PROCEDURE — 96910 PHOTCHMTX TAR&UVB/PTRLTM&UVB: CPT

## 2022-04-19 PROCEDURE — 99214 OFFICE O/P EST MOD 30 MIN: CPT | Mod: 25

## 2022-04-21 ENCOUNTER — NON-APPOINTMENT (OUTPATIENT)
Age: 72
End: 2022-04-21

## 2022-04-21 ENCOUNTER — APPOINTMENT (OUTPATIENT)
Dept: DERMATOLOGY | Facility: CLINIC | Age: 72
End: 2022-04-21
Payer: MEDICARE

## 2022-04-21 PROCEDURE — 96910 PHOTCHMTX TAR&UVB/PTRLTM&UVB: CPT

## 2022-04-22 ENCOUNTER — NON-APPOINTMENT (OUTPATIENT)
Age: 72
End: 2022-04-22

## 2022-04-26 ENCOUNTER — APPOINTMENT (OUTPATIENT)
Dept: DERMATOLOGY | Facility: CLINIC | Age: 72
End: 2022-04-26
Payer: MEDICARE

## 2022-04-26 PROCEDURE — 96910 PHOTCHMTX TAR&UVB/PTRLTM&UVB: CPT

## 2022-04-27 LAB
ALBUMIN MFR SERPL ELPH: 63.6 %
ALBUMIN SERPL ELPH-MCNC: 4.3 G/DL
ALBUMIN SERPL-MCNC: 4.3 G/DL
ALBUMIN/GLOB SERPL: 1.8 RATIO
ALBUPE: 10.9 %
ALP BLD-CCNC: 39 U/L
ALPHA1 GLOB MFR SERPL ELPH: 3.8 %
ALPHA1 GLOB SERPL ELPH-MCNC: 0.3 G/DL
ALPHA1UPE: 36 %
ALPHA2 GLOB MFR SERPL ELPH: 8 %
ALPHA2 GLOB SERPL ELPH-MCNC: 0.5 G/DL
ALPHA2UPE: 21.1 %
ALT SERPL-CCNC: 29 U/L
ANION GAP SERPL CALC-SCNC: 14 MMOL/L
AST SERPL-CCNC: 45 U/L
B-GLOBULIN MFR SERPL ELPH: 10.2 %
B-GLOBULIN SERPL ELPH-MCNC: 0.7 G/DL
BASOPHILS # BLD AUTO: 0.04 K/UL
BASOPHILS NFR BLD AUTO: 1.1 %
BETAUPE: 20.3 %
BILIRUB SERPL-MCNC: 0.4 MG/DL
BMZ BP180 IGG SERPL-ACNC: <2 RU/ML
BMZ BP230 IGG SERPL-ACNC: <2 RU/ML
BUN SERPL-MCNC: 15 MG/DL
CALCIUM SERPL-MCNC: 9.1 MG/DL
CHLORIDE SERPL-SCNC: 106 MMOL/L
CO2 SERPL-SCNC: 23 MMOL/L
CREAT 24H UR-MCNC: NORMAL G/24 H
CREAT SERPL-MCNC: 1.39 MG/DL
CREATININE UR (MAYO): 93 MG/DL
DEPRECATED KAPPA LC FREE/LAMBDA SER: 1.17 RATIO
EGFR: 55 ML/MIN/1.73M2
EOSINOPHIL # BLD AUTO: 0.28 K/UL
EOSINOPHIL NFR BLD AUTO: 7.8 %
GAMMA GLOB FLD ELPH-MCNC: 1 G/DL
GAMMA GLOB MFR SERPL ELPH: 14.4 %
GAMMAUPE: 11.7 %
GLUCOSE SERPL-MCNC: 93 MG/DL
HCT VFR BLD CALC: 31.3 %
HCV AB SER QL: NONREACTIVE
HCV S/CO RATIO: 0.15 S/CO
HGB BLD-MCNC: 10 G/DL
IGA 24H UR QL IFE: NORMAL
IGA SER QL IEP: 235 MG/DL
IGG SER QL IEP: 904 MG/DL
IGM SER QL IEP: 119 MG/DL
IMM GRANULOCYTES NFR BLD AUTO: 0 %
INTERPRETATION SERPL IEP-IMP: NORMAL
KAPPA LC 24H UR QL: NORMAL
KAPPA LC CSF-MCNC: 2.51 MG/DL
KAPPA LC SERPL-MCNC: 2.94 MG/DL
LYMPHOCYTES # BLD AUTO: 1.99 K/UL
LYMPHOCYTES NFR BLD AUTO: 55.4 %
M PROTEIN SPEC IFE-MCNC: NORMAL
MAN DIFF?: NORMAL
MCHC RBC-ENTMCNC: 30.4 PG
MCHC RBC-ENTMCNC: 31.9 GM/DL
MCV RBC AUTO: 95.1 FL
MONOCYTES # BLD AUTO: 0.48 K/UL
MONOCYTES NFR BLD AUTO: 13.4 %
NEUTROPHILS # BLD AUTO: 0.8 K/UL
NEUTROPHILS NFR BLD AUTO: 22.3 %
PLATELET # BLD AUTO: 199 K/UL
POTASSIUM SERPL-SCNC: 4.6 MMOL/L
PROT PATTERN 24H UR ELPH-IMP: NORMAL
PROT SERPL-MCNC: 6.7 G/DL
PROT UR-MCNC: 8 MG/DL
PROT UR-MCNC: 8 MG/DL
RBC # BLD: 3.29 M/UL
RBC # FLD: 15.7 %
SODIUM SERPL-SCNC: 144 MMOL/L
SPECIMEN VOL 24H UR: NORMAL ML
TSH SERPL-ACNC: 9.55 UIU/ML
WBC # FLD AUTO: 3.59 K/UL

## 2022-04-28 ENCOUNTER — APPOINTMENT (OUTPATIENT)
Dept: DERMATOLOGY | Facility: CLINIC | Age: 72
End: 2022-04-28
Payer: MEDICARE

## 2022-04-28 PROCEDURE — 96910 PHOTCHMTX TAR&UVB/PTRLTM&UVB: CPT

## 2022-05-03 ENCOUNTER — APPOINTMENT (OUTPATIENT)
Dept: DERMATOLOGY | Facility: CLINIC | Age: 72
End: 2022-05-03
Payer: MEDICARE

## 2022-05-03 PROCEDURE — 96910 PHOTCHMTX TAR&UVB/PTRLTM&UVB: CPT

## 2022-05-05 ENCOUNTER — APPOINTMENT (OUTPATIENT)
Dept: DERMATOLOGY | Facility: CLINIC | Age: 72
End: 2022-05-05
Payer: MEDICARE

## 2022-05-05 PROCEDURE — 96910 PHOTCHMTX TAR&UVB/PTRLTM&UVB: CPT

## 2022-05-10 ENCOUNTER — APPOINTMENT (OUTPATIENT)
Dept: DERMATOLOGY | Facility: CLINIC | Age: 72
End: 2022-05-10
Payer: MEDICARE

## 2022-05-10 PROCEDURE — 96910 PHOTCHMTX TAR&UVB/PTRLTM&UVB: CPT

## 2022-05-10 NOTE — ED ADULT NURSE NOTE - NS ED BHA MSE SPEECH RATE
Assessment/Plan:    Medicare annual wellness visit, subsequent  Discussed preventative health, cancer screening, immunizations, and safety issues  Patient is up-to-date with colorectal cancer screening with colonoscopy done December 1, 2021 with recommendations to recheck again in 5 years  Patient up-to-date with immunizations  Chronic GERD  Pt has successfully decreased omeprazole to 20 mg daily  He still does get some burping  No problems with food getting stuck  He was unable to decrease omeprazole to every other day  Patient asking about EGD again with his long-standing GERD    Impaired fasting glucose  Continue with healthy diet and exercise  Will recheck with next labs    Mixed hyperlipidemia  Continue statin along with healthy diet and exercise, will recheck with next labs    Vitamin D deficiency  Continue vitamin-D, will check with next labs    Pulmonary nodule  I recommend rechecking    HTN (hypertension), benign  Controlled, continue meds along with healthy diet and exercise    Erectile dysfunction  Continue med as needed         Diagnoses and all orders for this visit:    Mixed hyperlipidemia  -     CBC and differential; Future  -     Comprehensive metabolic panel; Future  -     Lipid Panel with Direct LDL reflex; Future  -     TSH, 3rd generation with Free T4 reflex; Future    Medicare annual wellness visit, subsequent    Chronic GERD  -     Ambulatory referral to Gastroenterology; Future    Impaired fasting glucose  -     Hemoglobin A1C; Future    Vitamin D deficiency  -     Vitamin D 25 hydroxy; Future    Pulmonary nodule  -     CT chest wo contrast; Future    Skin lesions  -     Ambulatory Referral to Plastic Surgery; Future    HTN (hypertension), benign    Erectile dysfunction, unspecified erectile dysfunction type    Screening for prostate cancer  -     PSA, Total Screen; Future        Subjective:      Patient ID: Humphrey Leija is a 70 y o  male      HPI    The following portions of the patient's history were reviewed and updated as appropriate: allergies, current medications, past family history, past medical history, past social history, past surgical history and problem list     Review of Systems      Objective:      /72 (BP Location: Left arm, Patient Position: Sitting, Cuff Size: Standard)   Pulse 64   Ht 5' 8 5" (1 74 m)   Wt 77 6 kg (171 lb)   SpO2 97%   BMI 25 62 kg/m²          Physical Exam Normal

## 2022-05-12 ENCOUNTER — APPOINTMENT (OUTPATIENT)
Dept: DERMATOLOGY | Facility: CLINIC | Age: 72
End: 2022-05-12

## 2022-05-17 ENCOUNTER — APPOINTMENT (OUTPATIENT)
Dept: DERMATOLOGY | Facility: CLINIC | Age: 72
End: 2022-05-17

## 2022-05-19 ENCOUNTER — APPOINTMENT (OUTPATIENT)
Dept: DERMATOLOGY | Facility: CLINIC | Age: 72
End: 2022-05-19

## 2022-05-23 ENCOUNTER — NON-APPOINTMENT (OUTPATIENT)
Age: 72
End: 2022-05-23

## 2022-05-23 ENCOUNTER — INPATIENT (INPATIENT)
Facility: HOSPITAL | Age: 72
LOS: 2 days | Discharge: NURSING, REHAB OR LONG-TERM CARE | DRG: 683 | End: 2022-05-26
Attending: HOSPITALIST | Admitting: HOSPITALIST
Payer: MEDICARE

## 2022-05-23 VITALS
DIASTOLIC BLOOD PRESSURE: 90 MMHG | RESPIRATION RATE: 20 BRPM | WEIGHT: 169.98 LBS | TEMPERATURE: 97 F | HEIGHT: 69 IN | OXYGEN SATURATION: 96 % | HEART RATE: 110 BPM | SYSTOLIC BLOOD PRESSURE: 150 MMHG

## 2022-05-23 LAB
ALBUMIN SERPL ELPH-MCNC: 4.1 G/DL — SIGNIFICANT CHANGE UP (ref 3.3–5)
ALP SERPL-CCNC: 112 U/L — SIGNIFICANT CHANGE UP (ref 40–120)
ALT FLD-CCNC: 74 U/L — HIGH (ref 10–45)
ANION GAP SERPL CALC-SCNC: 15 MMOL/L — SIGNIFICANT CHANGE UP (ref 5–17)
AST SERPL-CCNC: 65 U/L — HIGH (ref 10–40)
BASE EXCESS BLDV CALC-SCNC: 6.2 MMOL/L — HIGH (ref -2–2)
BASOPHILS # BLD AUTO: 0.09 K/UL — SIGNIFICANT CHANGE UP (ref 0–0.2)
BASOPHILS NFR BLD AUTO: 1 % — SIGNIFICANT CHANGE UP (ref 0–2)
BILIRUB SERPL-MCNC: 0.8 MG/DL — SIGNIFICANT CHANGE UP (ref 0.2–1.2)
BUN SERPL-MCNC: 33 MG/DL — HIGH (ref 7–23)
CA-I SERPL-SCNC: 1.26 MMOL/L — SIGNIFICANT CHANGE UP (ref 1.15–1.33)
CALCIUM SERPL-MCNC: 10.4 MG/DL — SIGNIFICANT CHANGE UP (ref 8.4–10.5)
CHLORIDE BLDV-SCNC: 99 MMOL/L — SIGNIFICANT CHANGE UP (ref 96–108)
CHLORIDE SERPL-SCNC: 96 MMOL/L — SIGNIFICANT CHANGE UP (ref 96–108)
CO2 BLDV-SCNC: 33 MMOL/L — HIGH (ref 22–26)
CO2 SERPL-SCNC: 26 MMOL/L — SIGNIFICANT CHANGE UP (ref 22–31)
CREAT SERPL-MCNC: 1.92 MG/DL — HIGH (ref 0.5–1.3)
EGFR: 37 ML/MIN/1.73M2 — LOW
EOSINOPHIL # BLD AUTO: 0.17 K/UL — SIGNIFICANT CHANGE UP (ref 0–0.5)
EOSINOPHIL NFR BLD AUTO: 2 % — SIGNIFICANT CHANGE UP (ref 0–6)
GAS PNL BLDV: 136 MMOL/L — SIGNIFICANT CHANGE UP (ref 136–145)
GAS PNL BLDV: SIGNIFICANT CHANGE UP
GAS PNL BLDV: SIGNIFICANT CHANGE UP
GLUCOSE BLDV-MCNC: 198 MG/DL — HIGH (ref 70–99)
GLUCOSE SERPL-MCNC: 197 MG/DL — HIGH (ref 70–99)
HCO3 BLDV-SCNC: 31 MMOL/L — HIGH (ref 22–29)
HCT VFR BLD CALC: 30.9 % — LOW (ref 39–50)
HCT VFR BLDA CALC: 32 % — LOW (ref 39–51)
HGB BLD CALC-MCNC: 10.7 G/DL — LOW (ref 12.6–17.4)
HGB BLD-MCNC: 10 G/DL — LOW (ref 13–17)
LACTATE BLDV-MCNC: 1.8 MMOL/L — SIGNIFICANT CHANGE UP (ref 0.7–2)
LYMPHOCYTES # BLD AUTO: 1.79 K/UL — SIGNIFICANT CHANGE UP (ref 1–3.3)
LYMPHOCYTES # BLD AUTO: 21 % — SIGNIFICANT CHANGE UP (ref 13–44)
MANUAL SMEAR VERIFICATION: SIGNIFICANT CHANGE UP
MCHC RBC-ENTMCNC: 30.1 PG — SIGNIFICANT CHANGE UP (ref 27–34)
MCHC RBC-ENTMCNC: 32.4 GM/DL — SIGNIFICANT CHANGE UP (ref 32–36)
MCV RBC AUTO: 93.1 FL — SIGNIFICANT CHANGE UP (ref 80–100)
MONOCYTES # BLD AUTO: 0.6 K/UL — SIGNIFICANT CHANGE UP (ref 0–0.9)
MONOCYTES NFR BLD AUTO: 7 % — SIGNIFICANT CHANGE UP (ref 2–14)
NEUTROPHILS # BLD AUTO: 5.89 K/UL — SIGNIFICANT CHANGE UP (ref 1.8–7.4)
NEUTROPHILS NFR BLD AUTO: 69 % — SIGNIFICANT CHANGE UP (ref 43–77)
NRBC # BLD: 0 /100 — SIGNIFICANT CHANGE UP (ref 0–0)
PCO2 BLDV: 46 MMHG — SIGNIFICANT CHANGE UP (ref 42–55)
PH BLDV: 7.44 — HIGH (ref 7.32–7.43)
PLAT MORPH BLD: NORMAL — SIGNIFICANT CHANGE UP
PLATELET # BLD AUTO: 64 K/UL — LOW (ref 150–400)
PO2 BLDV: 36 MMHG — SIGNIFICANT CHANGE UP (ref 25–45)
POTASSIUM BLDV-SCNC: 4.8 MMOL/L — SIGNIFICANT CHANGE UP (ref 3.5–5.1)
POTASSIUM SERPL-MCNC: 4.3 MMOL/L — SIGNIFICANT CHANGE UP (ref 3.5–5.3)
POTASSIUM SERPL-SCNC: 4.3 MMOL/L — SIGNIFICANT CHANGE UP (ref 3.5–5.3)
PROT SERPL-MCNC: 8.1 G/DL — SIGNIFICANT CHANGE UP (ref 6–8.3)
RAPID RVP RESULT: SIGNIFICANT CHANGE UP
RBC # BLD: 3.32 M/UL — LOW (ref 4.2–5.8)
RBC # FLD: 16 % — HIGH (ref 10.3–14.5)
RBC BLD AUTO: SIGNIFICANT CHANGE UP
SAO2 % BLDV: 57.1 % — LOW (ref 67–88)
SARS-COV-2 RNA SPEC QL NAA+PROBE: SIGNIFICANT CHANGE UP
SODIUM SERPL-SCNC: 137 MMOL/L — SIGNIFICANT CHANGE UP (ref 135–145)
WBC # BLD: 8.53 K/UL — SIGNIFICANT CHANGE UP (ref 3.8–10.5)
WBC # FLD AUTO: 8.53 K/UL — SIGNIFICANT CHANGE UP (ref 3.8–10.5)

## 2022-05-23 PROCEDURE — 71045 X-RAY EXAM CHEST 1 VIEW: CPT | Mod: 26

## 2022-05-23 PROCEDURE — 93010 ELECTROCARDIOGRAM REPORT: CPT | Mod: GC

## 2022-05-23 PROCEDURE — 99285 EMERGENCY DEPT VISIT HI MDM: CPT | Mod: GC

## 2022-05-23 PROCEDURE — 70450 CT HEAD/BRAIN W/O DYE: CPT | Mod: 26,MA

## 2022-05-23 NOTE — ED PROVIDER NOTE - NS ED ROS FT
GENERAL: No fever, chills  EYES: no vision changes, no discharge.   ENT: no difficulty swallowing or speaking   CARDIAC: no chest pain/pressure, SOB, lower extremity swelling  PULMONARY: no cough, SOB  GI: no abdominal pain, n/v/d  : no dysuria  SKIN: no rashes  NEURO: no headache, lightheadedness, paresthesia  MSK: No joint pain, myalgia, weakness.

## 2022-05-23 NOTE — ED ADULT NURSE NOTE - NSIMPLEMENTINTERV_GEN_ALL_ED
Implemented All Fall Risk Interventions:  [place] to call system. Call bell, personal items and telephone within reach. Instruct patient to call for assistance. Room bathroom lighting operational. Non-slip footwear when patient is off stretcher. Physically safe environment: no spills, clutter or unnecessary equipment. Stretcher in lowest position, wheels locked, appropriate side rails in place. Provide visual cue, wrist band, yellow gown, etc. Monitor gait and stability. Monitor for mental status changes and reorient to person, place, and time. Review medications for side effects contributing to fall risk. Reinforce activity limits and safety measures with patient and family.

## 2022-05-23 NOTE — ED PROVIDER NOTE - OBJECTIVE STATEMENT
Pt is a 72 yo M with HTN, DM2, recent CHIARA (Cr 8 requiring dialysis and ICU stay at [facility], discharged 5/23 with Cr of 1.9) who presents with AMS and weakness. Pt's son is at bedside and states that he thought they were ready to take him home today but that caring for pt has been difficult as he cannot ambulate and gets delirious. Pt had kidney failure and encephalopathy likely 2/2 alcohol use but has not had alcohol since hospital admission. Son would like pt to get rehab

## 2022-05-23 NOTE — ED PROVIDER NOTE - PHYSICAL EXAMINATION
Renetta Shetty MD  GENERAL: Patient awake alert NAD.  HEENT: NC/AT, Moist mucous membranes, EOMI.  LUNGS: CTAB, no wheezes or crackles.   CARDIAC: RRR, no m/r/g.    ABDOMEN: Soft, NT, ND, No rebound, guarding. No CVA tenderness.   EXT: No edema. No calf tenderness.  MSK: no pain with movement, no deformities.  NEURO: A&Ox2. Moving all extremities. cn 2-12 intact  SKIN: Warm and dry. No rash.  PSYCH: Normal affect.

## 2022-05-23 NOTE — ED PROVIDER NOTE - ATTENDING CONTRIBUTION TO CARE
pt is a 72 y/o male with weakness pt is a 70 y/o male with weakness · HPI Objective Statement: 71 year old male BIBEMS from home c/o weakness just released from [facility] with new onset diabetes, fs here 190s unclear if that is the cause, r/o infectious, metabolic cause.

## 2022-05-24 ENCOUNTER — APPOINTMENT (OUTPATIENT)
Dept: DERMATOLOGY | Facility: CLINIC | Age: 72
End: 2022-05-24

## 2022-05-24 DIAGNOSIS — N17.9 ACUTE KIDNEY FAILURE, UNSPECIFIED: ICD-10-CM

## 2022-05-24 DIAGNOSIS — R53.1 WEAKNESS: ICD-10-CM

## 2022-05-24 DIAGNOSIS — F10.10 ALCOHOL ABUSE, UNCOMPLICATED: ICD-10-CM

## 2022-05-24 DIAGNOSIS — I10 ESSENTIAL (PRIMARY) HYPERTENSION: ICD-10-CM

## 2022-05-24 DIAGNOSIS — E11.65 TYPE 2 DIABETES MELLITUS WITH HYPERGLYCEMIA: ICD-10-CM

## 2022-05-24 DIAGNOSIS — E78.5 HYPERLIPIDEMIA, UNSPECIFIED: ICD-10-CM

## 2022-05-24 LAB
APPEARANCE UR: CLEAR — SIGNIFICANT CHANGE UP
BACTERIA # UR AUTO: NEGATIVE — SIGNIFICANT CHANGE UP
BILIRUB UR-MCNC: NEGATIVE — SIGNIFICANT CHANGE UP
COLOR SPEC: YELLOW — SIGNIFICANT CHANGE UP
DIFF PNL FLD: NEGATIVE — SIGNIFICANT CHANGE UP
EPI CELLS # UR: 3 /HPF — SIGNIFICANT CHANGE UP
GLUCOSE BLDC GLUCOMTR-MCNC: 139 MG/DL — HIGH (ref 70–99)
GLUCOSE BLDC GLUCOMTR-MCNC: 147 MG/DL — HIGH (ref 70–99)
GLUCOSE BLDC GLUCOMTR-MCNC: 169 MG/DL — HIGH (ref 70–99)
GLUCOSE UR QL: NEGATIVE — SIGNIFICANT CHANGE UP
HYALINE CASTS # UR AUTO: 2 /LPF — SIGNIFICANT CHANGE UP (ref 0–2)
KETONES UR-MCNC: NEGATIVE — SIGNIFICANT CHANGE UP
LEUKOCYTE ESTERASE UR-ACNC: NEGATIVE — SIGNIFICANT CHANGE UP
NITRITE UR-MCNC: NEGATIVE — SIGNIFICANT CHANGE UP
PH UR: 6.5 — SIGNIFICANT CHANGE UP (ref 5–8)
PROT UR-MCNC: ABNORMAL
RBC CASTS # UR COMP ASSIST: 4 /HPF — SIGNIFICANT CHANGE UP (ref 0–4)
SP GR SPEC: 1.02 — SIGNIFICANT CHANGE UP (ref 1.01–1.02)
UROBILINOGEN FLD QL: NEGATIVE — SIGNIFICANT CHANGE UP
WBC UR QL: 4 /HPF — SIGNIFICANT CHANGE UP (ref 0–5)

## 2022-05-24 PROCEDURE — 99223 1ST HOSP IP/OBS HIGH 75: CPT

## 2022-05-24 RX ORDER — FOLIC ACID 0.8 MG
1 TABLET ORAL DAILY
Refills: 0 | Status: DISCONTINUED | OUTPATIENT
Start: 2022-05-24 | End: 2022-05-26

## 2022-05-24 RX ORDER — LANOLIN ALCOHOL/MO/W.PET/CERES
3 CREAM (GRAM) TOPICAL AT BEDTIME
Refills: 0 | Status: DISCONTINUED | OUTPATIENT
Start: 2022-05-24 | End: 2022-05-26

## 2022-05-24 RX ORDER — THIAMINE MONONITRATE (VIT B1) 100 MG
100 TABLET ORAL DAILY
Refills: 0 | Status: DISCONTINUED | OUTPATIENT
Start: 2022-05-24 | End: 2022-05-26

## 2022-05-24 RX ORDER — ACETAMINOPHEN 500 MG
650 TABLET ORAL EVERY 6 HOURS
Refills: 0 | Status: DISCONTINUED | OUTPATIENT
Start: 2022-05-24 | End: 2022-05-26

## 2022-05-24 RX ORDER — GLUCAGON INJECTION, SOLUTION 0.5 MG/.1ML
1 INJECTION, SOLUTION SUBCUTANEOUS ONCE
Refills: 0 | Status: DISCONTINUED | OUTPATIENT
Start: 2022-05-24 | End: 2022-05-26

## 2022-05-24 RX ORDER — INSULIN LISPRO 100/ML
VIAL (ML) SUBCUTANEOUS AT BEDTIME
Refills: 0 | Status: DISCONTINUED | OUTPATIENT
Start: 2022-05-24 | End: 2022-05-26

## 2022-05-24 RX ORDER — SEVELAMER CARBONATE 2400 MG/1
800 POWDER, FOR SUSPENSION ORAL
Refills: 0 | Status: DISCONTINUED | OUTPATIENT
Start: 2022-05-24 | End: 2022-05-26

## 2022-05-24 RX ORDER — MULTIVIT-MIN/FERROUS GLUCONATE 9 MG/15 ML
1 LIQUID (ML) ORAL
Qty: 0 | Refills: 0 | DISCHARGE

## 2022-05-24 RX ORDER — METFORMIN HYDROCHLORIDE 850 MG/1
1 TABLET ORAL
Qty: 0 | Refills: 0 | DISCHARGE

## 2022-05-24 RX ORDER — SIMVASTATIN 20 MG/1
20 TABLET, FILM COATED ORAL AT BEDTIME
Refills: 0 | Status: DISCONTINUED | OUTPATIENT
Start: 2022-05-24 | End: 2022-05-26

## 2022-05-24 RX ORDER — DEXTROSE 50 % IN WATER 50 %
12.5 SYRINGE (ML) INTRAVENOUS ONCE
Refills: 0 | Status: DISCONTINUED | OUTPATIENT
Start: 2022-05-24 | End: 2022-05-26

## 2022-05-24 RX ORDER — SODIUM CHLORIDE 9 MG/ML
1000 INJECTION, SOLUTION INTRAVENOUS
Refills: 0 | Status: DISCONTINUED | OUTPATIENT
Start: 2022-05-24 | End: 2022-05-26

## 2022-05-24 RX ORDER — ONDANSETRON 8 MG/1
4 TABLET, FILM COATED ORAL EVERY 8 HOURS
Refills: 0 | Status: DISCONTINUED | OUTPATIENT
Start: 2022-05-24 | End: 2022-05-26

## 2022-05-24 RX ORDER — ESCITALOPRAM OXALATE 10 MG/1
20 TABLET, FILM COATED ORAL DAILY
Refills: 0 | Status: DISCONTINUED | OUTPATIENT
Start: 2022-05-24 | End: 2022-05-25

## 2022-05-24 RX ORDER — INSULIN LISPRO 100/ML
VIAL (ML) SUBCUTANEOUS
Refills: 0 | Status: DISCONTINUED | OUTPATIENT
Start: 2022-05-24 | End: 2022-05-26

## 2022-05-24 RX ORDER — DEXTROSE 50 % IN WATER 50 %
25 SYRINGE (ML) INTRAVENOUS ONCE
Refills: 0 | Status: DISCONTINUED | OUTPATIENT
Start: 2022-05-24 | End: 2022-05-26

## 2022-05-24 RX ORDER — DEXTROSE 50 % IN WATER 50 %
15 SYRINGE (ML) INTRAVENOUS ONCE
Refills: 0 | Status: DISCONTINUED | OUTPATIENT
Start: 2022-05-24 | End: 2022-05-26

## 2022-05-24 RX ORDER — TRAZODONE HCL 50 MG
150 TABLET ORAL AT BEDTIME
Refills: 0 | Status: DISCONTINUED | OUTPATIENT
Start: 2022-05-24 | End: 2022-05-26

## 2022-05-24 RX ADMIN — SEVELAMER CARBONATE 800 MILLIGRAM(S): 2400 POWDER, FOR SUSPENSION ORAL at 18:09

## 2022-05-24 RX ADMIN — Medication 650 MILLIGRAM(S): at 19:29

## 2022-05-24 RX ADMIN — ESCITALOPRAM OXALATE 20 MILLIGRAM(S): 10 TABLET, FILM COATED ORAL at 18:09

## 2022-05-24 RX ADMIN — Medication 1: at 12:57

## 2022-05-24 RX ADMIN — Medication 100 MILLIGRAM(S): at 18:09

## 2022-05-24 RX ADMIN — SIMVASTATIN 20 MILLIGRAM(S): 20 TABLET, FILM COATED ORAL at 22:17

## 2022-05-24 RX ADMIN — Medication 150 MILLIGRAM(S): at 22:08

## 2022-05-24 RX ADMIN — Medication 1 MILLIGRAM(S): at 18:09

## 2022-05-24 NOTE — H&P ADULT - ASSESSMENT
72 y/o M w/ hx opf HTN, DM Type 2, alcohol abuse brought in by family for weakness, difficulty ambulating, confusion, and need for rehab placement.

## 2022-05-24 NOTE — PATIENT PROFILE ADULT - FALL HARM RISK - HARM RISK INTERVENTIONS
Assistance with ambulation/Assistance OOB with selected safe patient handling equipment/Communicate Risk of Fall with Harm to all staff/Discuss with provider need for PT consult/Monitor gait and stability/Reinforce activity limits and safety measures with patient and family/Tailored Fall Risk Interventions/Visual Cue: Yellow wristband and red socks/Bed in lowest position, wheels locked, appropriate side rails in place/Call bell, personal items and telephone in reach/Instruct patient to call for assistance before getting out of bed or chair/Non-slip footwear when patient is out of bed/[place] to call system/Physically safe environment - no spills, clutter or unnecessary equipment/Purposeful Proactive Rounding/Room/bathroom lighting operational, light cord in reach

## 2022-05-24 NOTE — H&P ADULT - PROBLEM SELECTOR PLAN 5
Monitor for signs or symptoms of withdrawal. No recent alcohol use as per patient and family.  c/w folic acid and thiamine Monitor for signs or symptoms of withdrawal. No recent alcohol use as per patient and family as he was just at [facility] for the past week.   c/w folic acid and thiamine

## 2022-05-24 NOTE — PATIENT PROFILE ADULT - FUNCTIONAL ASSESSMENT - DAILY ACTIVITY SECTION LABEL
Medical Necessity Statement: Based on my medical judgement, Mohs surgery is the most appropriate treatment for this cancer compared to other treatments. .

## 2022-05-24 NOTE — H&P ADULT - HISTORY OF PRESENT ILLNESS
Patient's mother, Giovana, indicated that she completed & signed Release of Information form and provided it to Marisol Alberts RN on yesterday, 7/20.    RN Coordinator to follow up with patient's care team at [facility] to obtain GUILLERMINA form. (RNs Alex Alvarado & Jazmin Juan)    Sent message to mother for her to contact NORMA Chavarria/ Dr. Humphrey's office regarding ALS Query Appt.               Tomeka Conte RN, BSN, BS  ALS Clinical Care Coordinator  [facility]  Tel: [phone]  Fax: [phone]  E-mail: [email or web address]       Pt is a 70 yo M with HTN, DM2, recent CHIARA (Cr 8 requiring dialysis and ICU stay at [facility], discharged 5/23 with Cr of 1.9) who presents with AMS and weakness. Pt's son is at bedside and states that he thought they were ready to take him home today but that caring for pt has been difficult as he cannot ambulate and gets delirious. Pt had kidney failure and encephalopathy likely 2/2 alcohol use but has not had alcohol since hospital admission. Son would like pt to get rehab   Pt is a 72 yo M with HTN, DM2, recent CHIARA (Cr 8 requiring dialysis and ICU stay at [facility], discharged 5/23 with Cr of 1.9) who presents with AMS and weakness. Pt's son is at bedside and states that he thought they were ready to take him home today but that caring for pt has been difficult as he cannot ambulate and gets delirious. Pt had kidney failure and encephalopathy likely 2/2 alcohol use but has not had alcohol since hospital admission. Son would like pt to get rehab.  Patient reports he was having back pain so went to [place] as that was closest to his home. His son claims his back pain was due to "kidney failure" from alcohol intoxication and required dialysis. He was in the MICU for almost a week, with last cr 1.9 and discharged earlier today. When he got home he had difficulty ambulating and was sliding off the toilet. The son felt that they could not care for him and he was at risk of falling so they brought him to [facility] for PT eval for rehab. No reported current complaints or concerns other than a dry cough.

## 2022-05-24 NOTE — H&P ADULT - PROBLEM SELECTOR PLAN 3
Diabetes Education and Nutrition Eval  Monitor for now on low correction scale qac + bedtime  Goal glucose 100-180  Outpt. endo follow-up  Outpt. optho, podiatry, nephrology  Plan to d/c on metformin depending on A1c. Diabetes Education and Nutrition Eval  Monitor for now on low correction scale qac + bedtime  Goal glucose 100-180  Outpt. endo follow-up  Outpt. optho, podiatry, nephrology  Plan to d/c on metformin depending on A1c. Needs to avoid alcohol while on metformin.

## 2022-05-24 NOTE — ED ADULT NURSE REASSESSMENT NOTE - NS ED NURSE REASSESS COMMENT FT1
Pt A+Ox3, VSS, urine sample collected and sent to lab. Pt aware of plan of care, admitted to medicine for weakness, pending admission bed. Report given to PAYAL Rhodes in ED Holding.

## 2022-05-24 NOTE — H&P ADULT - PROBLEM SELECTOR PLAN 1
PT Eval  Rehab placement pending  Check B12 level as patient is on metformin as outpatient  Check TFTs

## 2022-05-24 NOTE — H&P ADULT - NSHPLABSRESULTS_GEN_ALL_CORE
Cr 1.92  CO2 on VBG 33  U/A neg.  Alcohol 207  RVP and COVID neg.  CT head nondiagnostic with some atrophy and microvascular changes  CXR clear

## 2022-05-24 NOTE — H&P ADULT - PROBLEM SELECTOR PLAN 2
Nephrology follow-up as patient was just discharged s/p HD at [facility] Nephrology follow-up as patient was just discharged s/p HD at [facility]

## 2022-05-25 DIAGNOSIS — D69.6 THROMBOCYTOPENIA, UNSPECIFIED: ICD-10-CM

## 2022-05-25 DIAGNOSIS — E03.9 HYPOTHYROIDISM, UNSPECIFIED: ICD-10-CM

## 2022-05-25 LAB
A1C WITH ESTIMATED AVERAGE GLUCOSE RESULT: 5.6 % — SIGNIFICANT CHANGE UP (ref 4–5.6)
ALBUMIN SERPL ELPH-MCNC: 4 G/DL — SIGNIFICANT CHANGE UP (ref 3.3–5)
ALP SERPL-CCNC: 95 U/L — SIGNIFICANT CHANGE UP (ref 40–120)
ALT FLD-CCNC: 46 U/L — HIGH (ref 10–45)
ANION GAP SERPL CALC-SCNC: 14 MMOL/L — SIGNIFICANT CHANGE UP (ref 5–17)
AST SERPL-CCNC: 25 U/L — SIGNIFICANT CHANGE UP (ref 10–40)
BILIRUB SERPL-MCNC: 0.5 MG/DL — SIGNIFICANT CHANGE UP (ref 0.2–1.2)
BUN SERPL-MCNC: 30 MG/DL — HIGH (ref 7–23)
CALCIUM SERPL-MCNC: 10 MG/DL — SIGNIFICANT CHANGE UP (ref 8.4–10.5)
CHLORIDE SERPL-SCNC: 98 MMOL/L — SIGNIFICANT CHANGE UP (ref 96–108)
CO2 SERPL-SCNC: 28 MMOL/L — SIGNIFICANT CHANGE UP (ref 22–31)
CREAT SERPL-MCNC: 1.76 MG/DL — HIGH (ref 0.5–1.3)
EGFR: 41 ML/MIN/1.73M2 — LOW
ESTIMATED AVERAGE GLUCOSE: 114 MG/DL — SIGNIFICANT CHANGE UP (ref 68–114)
GLUCOSE BLDC GLUCOMTR-MCNC: 135 MG/DL — HIGH (ref 70–99)
GLUCOSE BLDC GLUCOMTR-MCNC: 135 MG/DL — HIGH (ref 70–99)
GLUCOSE BLDC GLUCOMTR-MCNC: 140 MG/DL — HIGH (ref 70–99)
GLUCOSE BLDC GLUCOMTR-MCNC: 251 MG/DL — HIGH (ref 70–99)
GLUCOSE SERPL-MCNC: 166 MG/DL — HIGH (ref 70–99)
HCT VFR BLD CALC: 29.9 % — LOW (ref 39–50)
HCV AB S/CO SERPL IA: 0.16 S/CO — SIGNIFICANT CHANGE UP (ref 0–0.99)
HCV AB SERPL-IMP: SIGNIFICANT CHANGE UP
HGB BLD-MCNC: 9.3 G/DL — LOW (ref 13–17)
MCHC RBC-ENTMCNC: 29.3 PG — SIGNIFICANT CHANGE UP (ref 27–34)
MCHC RBC-ENTMCNC: 31.1 GM/DL — LOW (ref 32–36)
MCV RBC AUTO: 94.3 FL — SIGNIFICANT CHANGE UP (ref 80–100)
NRBC # BLD: 0 /100 WBCS — SIGNIFICANT CHANGE UP (ref 0–0)
PLATELET # BLD AUTO: 44 K/UL — LOW (ref 150–400)
POTASSIUM SERPL-MCNC: 3.6 MMOL/L — SIGNIFICANT CHANGE UP (ref 3.5–5.3)
POTASSIUM SERPL-SCNC: 3.6 MMOL/L — SIGNIFICANT CHANGE UP (ref 3.5–5.3)
PROT SERPL-MCNC: 7.3 G/DL — SIGNIFICANT CHANGE UP (ref 6–8.3)
RBC # BLD: 3.17 M/UL — LOW (ref 4.2–5.8)
RBC # FLD: 15.6 % — HIGH (ref 10.3–14.5)
SODIUM SERPL-SCNC: 140 MMOL/L — SIGNIFICANT CHANGE UP (ref 135–145)
T4 FREE SERPL-MCNC: 1.3 NG/DL — SIGNIFICANT CHANGE UP (ref 0.9–1.8)
TSH SERPL-MCNC: 6 UIU/ML — HIGH (ref 0.27–4.2)
VIT B12 SERPL-MCNC: >2000 PG/ML — HIGH (ref 232–1245)
WBC # BLD: 6.23 K/UL — SIGNIFICANT CHANGE UP (ref 3.8–10.5)
WBC # FLD AUTO: 6.23 K/UL — SIGNIFICANT CHANGE UP (ref 3.8–10.5)

## 2022-05-25 PROCEDURE — 99233 SBSQ HOSP IP/OBS HIGH 50: CPT

## 2022-05-25 RX ORDER — LEVOTHYROXINE SODIUM 125 MCG
150 TABLET ORAL DAILY
Refills: 0 | Status: DISCONTINUED | OUTPATIENT
Start: 2022-05-25 | End: 2022-05-26

## 2022-05-25 RX ORDER — ESCITALOPRAM OXALATE 10 MG/1
10 TABLET, FILM COATED ORAL DAILY
Refills: 0 | Status: DISCONTINUED | OUTPATIENT
Start: 2022-05-25 | End: 2022-05-26

## 2022-05-25 RX ORDER — ASPIRIN/CALCIUM CARB/MAGNESIUM 324 MG
81 TABLET ORAL DAILY
Refills: 0 | Status: DISCONTINUED | OUTPATIENT
Start: 2022-05-25 | End: 2022-05-25

## 2022-05-25 RX ADMIN — ESCITALOPRAM OXALATE 20 MILLIGRAM(S): 10 TABLET, FILM COATED ORAL at 11:31

## 2022-05-25 RX ADMIN — SIMVASTATIN 20 MILLIGRAM(S): 20 TABLET, FILM COATED ORAL at 22:00

## 2022-05-25 RX ADMIN — SEVELAMER CARBONATE 800 MILLIGRAM(S): 2400 POWDER, FOR SUSPENSION ORAL at 06:27

## 2022-05-25 RX ADMIN — Medication 3: at 11:42

## 2022-05-25 RX ADMIN — Medication 1 MILLIGRAM(S): at 11:31

## 2022-05-25 RX ADMIN — SEVELAMER CARBONATE 800 MILLIGRAM(S): 2400 POWDER, FOR SUSPENSION ORAL at 17:39

## 2022-05-25 RX ADMIN — Medication 100 MILLIGRAM(S): at 11:31

## 2022-05-25 RX ADMIN — Medication 150 MILLIGRAM(S): at 22:00

## 2022-05-25 NOTE — PHYSICAL THERAPY INITIAL EVALUATION ADULT - PERTINENT HX OF CURRENT PROBLEM, REHAB EVAL
as per chart review: hx opf HTN, DM Type 2, alcohol abuse brought in by family for weakness, difficulty ambulating, confusion, and need for rehab placement

## 2022-05-25 NOTE — PROGRESS NOTE ADULT - PROBLEM SELECTOR PLAN 3
unclear etiology - could be alcohol related? though pt has not had alcohol recently  was on asa, hold for now, unclear indication no reported stents  cont to monitor history obtained from patient's son - began during last hospitalization after HD started, HIT excluded, possibly mechanical/shearing/clumping - has been in 40-60 range for some time now and is not improving above then.   sepsis was ruled out as well then, negative cultures    was on asa, hold for now, unclear indication no stents per son  cont to monitor - will ask heme to review smear. will review meds - lexapro can cause thrombocytopenia, will wean  if stable will be appropriate for outpatient f/u  no bleeding

## 2022-05-25 NOTE — PROGRESS NOTE ADULT - PROBLEM SELECTOR PLAN 5
Goal BP <130/80, continue to monitor.  per pt was on lisinopril & metoprolol - can restart if BP demands

## 2022-05-25 NOTE — PROGRESS NOTE ADULT - SUBJECTIVE AND OBJECTIVE BOX
[facility] Division of Hospital Medicine  Elio Maurer MD  Contact M-NELSY, [phone]-[phone] through [employer] Teams  Other Times:  [phone]    Patient is a 71y old  Male who presents with a chief complaint of Weakness (24 May 2022 15:15)    SUBJECTIVE / OVERNIGHT EVENTS: pt comfortable  ADDITIONAL REVIEW OF SYSTEMS:    MEDICATIONS  (STANDING):  dextrose 5%. 1000 milliLiter(s) (50 mL/Hr) IV Continuous <Continuous>  dextrose 5%. 1000 milliLiter(s) (100 mL/Hr) IV Continuous <Continuous>  dextrose 50% Injectable 25 Gram(s) IV Push once  dextrose 50% Injectable 12.5 Gram(s) IV Push once  dextrose 50% Injectable 25 Gram(s) IV Push once  escitalopram 20 milliGRAM(s) Oral daily  folic acid 1 milliGRAM(s) Oral daily  glucagon  Injectable 1 milliGRAM(s) IntraMuscular once  insulin lispro (ADMELOG) corrective regimen sliding scale   SubCutaneous three times a day before meals  insulin lispro (ADMELOG) corrective regimen sliding scale   SubCutaneous at bedtime  levothyroxine 150 MICROGram(s) Oral daily  sevelamer carbonate 800 milliGRAM(s) Oral two times a day  simvastatin 20 milliGRAM(s) Oral at bedtime  thiamine 100 milliGRAM(s) Oral daily  traZODone 150 milliGRAM(s) Oral at bedtime    MEDICATIONS  (PRN):  acetaminophen     Tablet .. 650 milliGRAM(s) Oral every 6 hours PRN Temp greater or equal to 38C (100.4F), Mild Pain (1 - 3)  aluminum hydroxide/magnesium hydroxide/simethicone Suspension 30 milliLiter(s) Oral every 4 hours PRN Dyspepsia  dextrose Oral Gel 15 Gram(s) Oral once PRN Blood Glucose LESS THAN 70 milliGRAM(s)/deciliter  melatonin 3 milliGRAM(s) Oral at bedtime PRN Insomnia  ondansetron Injectable 4 milliGRAM(s) IV Push every 8 hours PRN Nausea and/or Vomiting      CAPILLARY BLOOD GLUCOSE      POCT Blood Glucose.: 251 mg/dL (25 May 2022 11:41)  POCT Blood Glucose.: 140 mg/dL (25 May 2022 08:06)  POCT Blood Glucose.: 147 mg/dL (24 May 2022 18:05)    I&O's Summary    25 May 2022 07:01  -  25 May 2022 15:09  --------------------------------------------------------  IN: 440 mL / OUT: 0 mL / NET: 440 mL        PHYSICAL EXAM:  Vital Signs Last 24 Hrs  T(C): 36.9 (25 May 2022 10:07), Max: 36.9 (24 May 2022 20:09)  T(F): 98.5 (25 May 2022 10:07), Max: 98.5 (25 May 2022 10:07)  HR: 98 (25 May 2022 10:07) (81 - 103)  BP: 115/77 (25 May 2022 10:07) (110/76 - 120/78)  BP(mean): --  RR: 18 (25 May 2022 10:07) (18 - 18)  SpO2: 97% (25 May 2022 10:07) (97% - 100%)  CONSTITUTIONAL: NAD, well-developed, well-groomed  EYES: conjunctiva and sclera clear  ENMT: Moist oral mucosa, no pharyngeal injection or exudates; normal dentition  RESPIRATORY: Normal respiratory effort; lungs are clear to auscultation bilaterally  CARDIOVASCULAR: Regular rate and rhythm, normal S1 and S2, no murmur; No lower extremity edema;   ABDOMEN: Nontender to palpation, normoactive bowel sounds, no rebound/guarding;  MUSCULOSKELETAL:  ADAMS no tenderness  PSYCH: A+O to person, place, and time; affect appropriate  NEUROLOGY: grossly nonfocal upper/lower extremity, conversational  SKIN: No rashes; no palpable lesions    LABS:                        9.3    6.23  )-----------( 44       ( 25 May 2022 14:20 )             29.9     05-25    140  |  98  |  30<H>  ----------------------------<  166<H>  3.6   |  28  |  1.76<H>    Ca    10.0      25 May 2022 14:20    TPro  7.3  /  Alb  4.0  /  TBili  0.5  /  DBili  x   /  AST  25  /  ALT  46<H>  /  AlkPhos  95  05-25          Urinalysis Basic - ( 24 May 2022 04:42 )    Color: Yellow / Appearance: Clear / S.022 / pH: x  Gluc: x / Ketone: Negative  / Bili: Negative / Urobili: Negative   Blood: x / Protein: 30 mg/dL / Nitrite: Negative   Leuk Esterase: Negative / RBC: 4 /hpf / WBC 4 /HPF   Sq Epi: x / Non Sq Epi: 3 /hpf / Bacteria: Negative        Culture - Urine (collected 24 May 2022 04:42)  Source: Clean Catch Clean Catch (Midstream)  Preliminary Report (25 May 2022 10:23):    50,000 - 99,000 CFU/mL Escherichia coli        RADIOLOGY & ADDITIONAL TESTS:  Results Reviewed:   Imaging Personally Reviewed:  Electrocardiogram Personally Reviewed:    COORDINATION OF CARE:  Care Discussed with Consultants/Other Providers [Y/N]:  Prior or Outpatient Records Reviewed [Y/N]:

## 2022-05-25 NOTE — PHYSICAL THERAPY INITIAL EVALUATION ADULT - PLANNED THERAPY INTERVENTIONS, PT EVAL
Stair Negotiation Training: Patient will be able to negotiate up & down 1 flight of stairs independently with bilateral rails, step to gait pattern, in 4 weeks./bed mobility training/gait training/transfer training

## 2022-05-25 NOTE — CHART NOTE - NSCHARTNOTEFT_GEN_A_CORE
Peripheral smear reviewed: RBCs mostly normocytic, normochromic. Scant schistocytes so no TMA/TTP.  Dec platelets with some large platelets. Full consult to follow in the AM.

## 2022-05-26 ENCOUNTER — TRANSCRIPTION ENCOUNTER (OUTPATIENT)
Age: 72
End: 2022-05-26

## 2022-05-26 ENCOUNTER — APPOINTMENT (OUTPATIENT)
Dept: DERMATOLOGY | Facility: CLINIC | Age: 72
End: 2022-05-26

## 2022-05-26 VITALS
DIASTOLIC BLOOD PRESSURE: 70 MMHG | HEART RATE: 95 BPM | TEMPERATURE: 99 F | RESPIRATION RATE: 18 BRPM | SYSTOLIC BLOOD PRESSURE: 115 MMHG | OXYGEN SATURATION: 97 %

## 2022-05-26 LAB
-  AMIKACIN: SIGNIFICANT CHANGE UP
-  AMOXICILLIN/CLAVULANIC ACID: SIGNIFICANT CHANGE UP
-  AMPICILLIN/SULBACTAM: SIGNIFICANT CHANGE UP
-  AMPICILLIN: SIGNIFICANT CHANGE UP
-  AZTREONAM: SIGNIFICANT CHANGE UP
-  CEFAZOLIN: SIGNIFICANT CHANGE UP
-  CEFEPIME: SIGNIFICANT CHANGE UP
-  CEFOXITIN: SIGNIFICANT CHANGE UP
-  CEFTRIAXONE: SIGNIFICANT CHANGE UP
-  CIPROFLOXACIN: SIGNIFICANT CHANGE UP
-  ERTAPENEM: SIGNIFICANT CHANGE UP
-  GENTAMICIN: SIGNIFICANT CHANGE UP
-  IMIPENEM: SIGNIFICANT CHANGE UP
-  LEVOFLOXACIN: SIGNIFICANT CHANGE UP
-  MEROPENEM: SIGNIFICANT CHANGE UP
-  NITROFURANTOIN: SIGNIFICANT CHANGE UP
-  PIPERACILLIN/TAZOBACTAM: SIGNIFICANT CHANGE UP
-  TIGECYCLINE: SIGNIFICANT CHANGE UP
-  TOBRAMYCIN: SIGNIFICANT CHANGE UP
-  TRIMETHOPRIM/SULFAMETHOXAZOLE: SIGNIFICANT CHANGE UP
ANION GAP SERPL CALC-SCNC: 16 MMOL/L — SIGNIFICANT CHANGE UP (ref 5–17)
BUN SERPL-MCNC: 25 MG/DL — HIGH (ref 7–23)
CALCIUM SERPL-MCNC: 9.8 MG/DL — SIGNIFICANT CHANGE UP (ref 8.4–10.5)
CHLORIDE SERPL-SCNC: 98 MMOL/L — SIGNIFICANT CHANGE UP (ref 96–108)
CO2 SERPL-SCNC: 25 MMOL/L — SIGNIFICANT CHANGE UP (ref 22–31)
CREAT SERPL-MCNC: 1.78 MG/DL — HIGH (ref 0.5–1.3)
CULTURE RESULTS: SIGNIFICANT CHANGE UP
EGFR: 41 ML/MIN/1.73M2 — LOW
FOLATE SERPL-MCNC: >20 NG/ML — SIGNIFICANT CHANGE UP
GLUCOSE BLDC GLUCOMTR-MCNC: 120 MG/DL — HIGH (ref 70–99)
GLUCOSE BLDC GLUCOMTR-MCNC: 144 MG/DL — HIGH (ref 70–99)
GLUCOSE BLDC GLUCOMTR-MCNC: 155 MG/DL — HIGH (ref 70–99)
GLUCOSE SERPL-MCNC: 155 MG/DL — HIGH (ref 70–99)
HBV CORE AB SER-ACNC: SIGNIFICANT CHANGE UP
HBV SURFACE AB SER-ACNC: SIGNIFICANT CHANGE UP
HBV SURFACE AG SER-ACNC: SIGNIFICANT CHANGE UP
HCT VFR BLD CALC: 26.8 % — LOW (ref 39–50)
HGB BLD-MCNC: 8.7 G/DL — LOW (ref 13–17)
MCHC RBC-ENTMCNC: 30.2 PG — SIGNIFICANT CHANGE UP (ref 27–34)
MCHC RBC-ENTMCNC: 32.5 GM/DL — SIGNIFICANT CHANGE UP (ref 32–36)
MCV RBC AUTO: 93.1 FL — SIGNIFICANT CHANGE UP (ref 80–100)
METHOD TYPE: SIGNIFICANT CHANGE UP
NRBC # BLD: 0 /100 WBCS — SIGNIFICANT CHANGE UP (ref 0–0)
ORGANISM # SPEC MICROSCOPIC CNT: SIGNIFICANT CHANGE UP
ORGANISM # SPEC MICROSCOPIC CNT: SIGNIFICANT CHANGE UP
PLATELET # BLD AUTO: 51 K/UL — LOW (ref 150–400)
POTASSIUM SERPL-MCNC: 3.6 MMOL/L — SIGNIFICANT CHANGE UP (ref 3.5–5.3)
POTASSIUM SERPL-SCNC: 3.6 MMOL/L — SIGNIFICANT CHANGE UP (ref 3.5–5.3)
RBC # BLD: 2.88 M/UL — LOW (ref 4.2–5.8)
RBC # FLD: 15.5 % — HIGH (ref 10.3–14.5)
SODIUM SERPL-SCNC: 139 MMOL/L — SIGNIFICANT CHANGE UP (ref 135–145)
SPECIMEN SOURCE: SIGNIFICANT CHANGE UP
TSH SERPL-MCNC: 4.2 UIU/ML — SIGNIFICANT CHANGE UP (ref 0.27–4.2)
WBC # BLD: 8.16 K/UL — SIGNIFICANT CHANGE UP (ref 3.8–10.5)
WBC # FLD AUTO: 8.16 K/UL — SIGNIFICANT CHANGE UP (ref 3.8–10.5)

## 2022-05-26 PROCEDURE — 85018 HEMOGLOBIN: CPT

## 2022-05-26 PROCEDURE — 82607 VITAMIN B-12: CPT

## 2022-05-26 PROCEDURE — 87340 HEPATITIS B SURFACE AG IA: CPT

## 2022-05-26 PROCEDURE — 97161 PT EVAL LOW COMPLEX 20 MIN: CPT

## 2022-05-26 PROCEDURE — 76700 US EXAM ABDOM COMPLETE: CPT | Mod: 26

## 2022-05-26 PROCEDURE — 82435 ASSAY OF BLOOD CHLORIDE: CPT

## 2022-05-26 PROCEDURE — 82803 BLOOD GASES ANY COMBINATION: CPT

## 2022-05-26 PROCEDURE — 76700 US EXAM ABDOM COMPLETE: CPT

## 2022-05-26 PROCEDURE — 86038 ANTINUCLEAR ANTIBODIES: CPT

## 2022-05-26 PROCEDURE — 80053 COMPREHEN METABOLIC PANEL: CPT

## 2022-05-26 PROCEDURE — 87186 SC STD MICRODIL/AGAR DIL: CPT

## 2022-05-26 PROCEDURE — 70450 CT HEAD/BRAIN W/O DYE: CPT | Mod: MA

## 2022-05-26 PROCEDURE — 99285 EMERGENCY DEPT VISIT HI MDM: CPT | Mod: 25

## 2022-05-26 PROCEDURE — 82962 GLUCOSE BLOOD TEST: CPT

## 2022-05-26 PROCEDURE — 81001 URINALYSIS AUTO W/SCOPE: CPT

## 2022-05-26 PROCEDURE — 84439 ASSAY OF FREE THYROXINE: CPT

## 2022-05-26 PROCEDURE — 84132 ASSAY OF SERUM POTASSIUM: CPT

## 2022-05-26 PROCEDURE — 86803 HEPATITIS C AB TEST: CPT

## 2022-05-26 PROCEDURE — 82330 ASSAY OF CALCIUM: CPT

## 2022-05-26 PROCEDURE — 85025 COMPLETE CBC W/AUTO DIFF WBC: CPT

## 2022-05-26 PROCEDURE — 85014 HEMATOCRIT: CPT

## 2022-05-26 PROCEDURE — 86704 HEP B CORE ANTIBODY TOTAL: CPT

## 2022-05-26 PROCEDURE — 93005 ELECTROCARDIOGRAM TRACING: CPT

## 2022-05-26 PROCEDURE — 99239 HOSP IP/OBS DSCHRG MGMT >30: CPT

## 2022-05-26 PROCEDURE — 83036 HEMOGLOBIN GLYCOSYLATED A1C: CPT

## 2022-05-26 PROCEDURE — 82746 ASSAY OF FOLIC ACID SERUM: CPT

## 2022-05-26 PROCEDURE — 86706 HEP B SURFACE ANTIBODY: CPT

## 2022-05-26 PROCEDURE — 83605 ASSAY OF LACTIC ACID: CPT

## 2022-05-26 PROCEDURE — 80048 BASIC METABOLIC PNL TOTAL CA: CPT

## 2022-05-26 PROCEDURE — 85027 COMPLETE CBC AUTOMATED: CPT

## 2022-05-26 PROCEDURE — 0225U NFCT DS DNA&RNA 21 SARSCOV2: CPT

## 2022-05-26 PROCEDURE — 87086 URINE CULTURE/COLONY COUNT: CPT

## 2022-05-26 PROCEDURE — 71045 X-RAY EXAM CHEST 1 VIEW: CPT

## 2022-05-26 PROCEDURE — 84443 ASSAY THYROID STIM HORMONE: CPT

## 2022-05-26 PROCEDURE — 82947 ASSAY GLUCOSE BLOOD QUANT: CPT

## 2022-05-26 PROCEDURE — 84295 ASSAY OF SERUM SODIUM: CPT

## 2022-05-26 RX ORDER — ESCITALOPRAM OXALATE 10 MG/1
1 TABLET, FILM COATED ORAL
Qty: 0 | Refills: 0 | DISCHARGE
Start: 2022-05-26

## 2022-05-26 RX ORDER — THIAMINE MONONITRATE (VIT B1) 100 MG
1 TABLET ORAL
Qty: 0 | Refills: 0 | DISCHARGE

## 2022-05-26 RX ORDER — SEVELAMER CARBONATE 2400 MG/1
1 POWDER, FOR SUSPENSION ORAL
Qty: 0 | Refills: 0 | DISCHARGE

## 2022-05-26 RX ORDER — TRAZODONE HCL 50 MG
1 TABLET ORAL
Qty: 0 | Refills: 0 | DISCHARGE
Start: 2022-05-26

## 2022-05-26 RX ORDER — LEVOCETIRIZINE DIHYDROCHLORIDE 0.5 MG/ML
1 SOLUTION ORAL
Qty: 0 | Refills: 0 | DISCHARGE

## 2022-05-26 RX ORDER — SIMVASTATIN 20 MG/1
1 TABLET, FILM COATED ORAL
Qty: 0 | Refills: 0 | DISCHARGE
Start: 2022-05-26

## 2022-05-26 RX ORDER — FOLIC ACID 0.8 MG
1 TABLET ORAL
Qty: 0 | Refills: 0 | DISCHARGE
Start: 2022-05-26

## 2022-05-26 RX ORDER — LEVOTHYROXINE SODIUM 125 MCG
1 TABLET ORAL
Qty: 0 | Refills: 0 | DISCHARGE
Start: 2022-05-26

## 2022-05-26 RX ORDER — TRAZODONE HCL 50 MG
1.5 TABLET ORAL
Qty: 0 | Refills: 0 | DISCHARGE

## 2022-05-26 RX ORDER — SIMVASTATIN 20 MG/1
1 TABLET, FILM COATED ORAL
Qty: 0 | Refills: 0 | DISCHARGE

## 2022-05-26 RX ORDER — FOLIC ACID 0.8 MG
1 TABLET ORAL
Qty: 0 | Refills: 0 | DISCHARGE

## 2022-05-26 RX ORDER — THIAMINE MONONITRATE (VIT B1) 100 MG
1 TABLET ORAL
Qty: 0 | Refills: 0 | DISCHARGE
Start: 2022-05-26

## 2022-05-26 RX ORDER — CHLORHEXIDINE GLUCONATE 213 G/1000ML
1 SOLUTION TOPICAL DAILY
Refills: 0 | Status: DISCONTINUED | OUTPATIENT
Start: 2022-05-26 | End: 2022-05-26

## 2022-05-26 RX ORDER — ESCITALOPRAM OXALATE 10 MG/1
1 TABLET, FILM COATED ORAL
Qty: 0 | Refills: 0 | DISCHARGE

## 2022-05-26 RX ORDER — SEVELAMER CARBONATE 2400 MG/1
1 POWDER, FOR SUSPENSION ORAL
Qty: 0 | Refills: 0 | DISCHARGE
Start: 2022-05-26

## 2022-05-26 RX ADMIN — SEVELAMER CARBONATE 800 MILLIGRAM(S): 2400 POWDER, FOR SUSPENSION ORAL at 17:32

## 2022-05-26 RX ADMIN — CHLORHEXIDINE GLUCONATE 1 APPLICATION(S): 213 SOLUTION TOPICAL at 11:41

## 2022-05-26 RX ADMIN — Medication 1 MILLIGRAM(S): at 11:43

## 2022-05-26 RX ADMIN — ESCITALOPRAM OXALATE 10 MILLIGRAM(S): 10 TABLET, FILM COATED ORAL at 11:46

## 2022-05-26 RX ADMIN — Medication 150 MICROGRAM(S): at 05:50

## 2022-05-26 RX ADMIN — Medication 1: at 11:40

## 2022-05-26 RX ADMIN — SEVELAMER CARBONATE 800 MILLIGRAM(S): 2400 POWDER, FOR SUSPENSION ORAL at 05:50

## 2022-05-26 RX ADMIN — Medication 100 MILLIGRAM(S): at 11:42

## 2022-05-26 NOTE — DISCHARGE NOTE PROVIDER - NSFOLLOWUPCLINICS_GEN_ALL_ED_FT
Abdominal pain General [facility]  Hematology/Oncology  [address] [id number]  Phone: [phone]  Fax:   Follow Up Time: 2 weeks

## 2022-05-26 NOTE — PROGRESS NOTE ADULT - NSPROGADDITIONALINFOA_GEN_ALL_CORE
35 minutes spent orchestrating discharge
I spoke to patient's son Dr Fabian Fernando (OMFS resident) [phone] for additional history and questions answered

## 2022-05-26 NOTE — DISCHARGE NOTE PROVIDER - CARE PROVIDERS DIRECT ADDRESSES
[email or web address]

## 2022-05-26 NOTE — CONSULT NOTE ADULT - ATTENDING COMMENTS
SATISH Fernando is a patient ([place] and English speaking: [place]  phone used) admitted for weakness after discharge from [facility]. He has a history of cirrhosis and mild to moderate thrombocytopenia. Peripheral blood smear reviewed that shows normal appearing red cells (no blu forms or significant target cells, normal white blood cell morphology and decreased platelets some of which may be large. 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  I would advise observation; you may use steroids or transfusion for platelet counts less than 30 000.

## 2022-05-26 NOTE — PROGRESS NOTE ADULT - PROBLEM SELECTOR PLAN 1
PT Eval rec to KIM  Rehab placement pending  B12 acceptable  TSH elevated
PT Eval rec to KIM  Rehab placement pending  B12 acceptable  TSH elevated - synthroid restarted

## 2022-05-26 NOTE — DISCHARGE NOTE PROVIDER - NSDCMRMEDTOKEN_GEN_ALL_CORE_FT
B Complex 50:   Daily Multi-Vitamins with Minerals oral tablet: 1 tab(s) orally once a day  folic acid 1 mg oral tablet: 1 tab(s) orally once a day  Lexapro 20 mg oral tablet: 1 tab(s) orally once a day  metFORMIN 500 mg oral tablet: 1 tab(s) orally 2 times a day  sevelamer carbonate 800 mg oral tablet: 1 tab(s) orally 2 times a day  thiamine 100 mg oral tablet: 1 tab(s) orally once a day  traZODone 100 mg oral tablet: 1.5 tab(s) orally once a day (at bedtime)  Xyzal 5 mg oral tablet: 1 tab(s) orally once a day (in the evening), As Needed  Zocor 20 mg oral tablet: 1 tab(s) orally once a day (at bedtime)   Daily Multi-Vitamins with Minerals oral tablet: 1 tab(s) orally once a day  escitalopram 10 mg oral tablet: 1 tab(s) orally once a day  folic acid 1 mg oral tablet: 1 tab(s) orally once a day  levothyroxine 150 mcg (0.15 mg) oral tablet: 1 tab(s) orally once a day  metFORMIN 500 mg oral tablet: 1 tab(s) orally 2 times a day  sevelamer carbonate 800 mg oral tablet: 1 tab(s) orally 2 times a day  simvastatin 20 mg oral tablet: 1 tab(s) orally once a day (at bedtime)  thiamine 100 mg oral tablet: 1 tab(s) orally once a day  traZODone 150 mg oral tablet: 1 tab(s) orally once a day (at bedtime)

## 2022-05-26 NOTE — DISCHARGE NOTE PROVIDER - HOSPITAL COURSE
72 yo M with HTN, DM2, recent CHIARA (Cr 8 requiring dialysis and ICU stay at [facility], discharged 5/23 with Cr of 1.9) who presents with AMS and weakness. Pt's son is at bedside and states that he thought they were ready to take him home today but that caring for pt has been difficult as he cannot ambulate and gets delirious. Pt had kidney failure and encephalopathy likely 2/2 alcohol use but has not had alcohol since hospital admission.   He was seen by Heme and oncology and PT . He is hemodynamically stable to be discharged to rehab today, spoke to Attending, CM.  He needs a renal referral as op. 70 yo M with HTN, DM2, recent CHIARA (Cr 8 requiring dialysis and ICU stay at [facility], discharged 5/23 with Cr of 1.9) who presents with AMS and weakness. Pt's son is at bedside and states that he thought they were ready to take him home today but that caring for pt has been difficult as he cannot ambulate and gets delirious. Pt had kidney failure and encephalopathy likely 2/2 alcohol use but has not had alcohol since hospital admission.   He was seen by Heme and oncology and PT . He is hemodynamically stable to be discharged to rehab today, spoke to Attending, ROMULO.  He needs a renal referral as op.     Attending Addendum  Discharge Diagnoses  1) Weakness due to multifactorial including recovering CHIARA, post hospitalization syndrome

## 2022-05-26 NOTE — PROGRESS NOTE ADULT - PROBLEM SELECTOR PLAN 8
per outpatient records pt on Synthroid 150 - will restart
per outpatient records pt on Synthroid 150 - will restart

## 2022-05-26 NOTE — PROGRESS NOTE ADULT - PROBLEM SELECTOR PLAN 4
Diabetes Education and Nutrition Eval  Monitor for now on low correction scale qac + bedtime  Goal glucose 100-180  Outpt. endo follow-up  Outpt. optho, podiatry, nephrology  Plan to d/c on metformin depending on A1c and renal fxn. Needs to avoid alcohol while on metformin.
Diabetes Education and Nutrition Eval  Monitor for now on low correction scale qac + bedtime  Goal glucose 100-180  Outpt. endo follow-up  Outpt. optho, podiatry, nephrology  Plan to d/c on metformin depending on A1c and renal fxn. Needs to avoid alcohol while on metformin.

## 2022-05-26 NOTE — CONSULT NOTE ADULT - SUBJECTIVE AND OBJECTIVE BOX
HPI:  71M with HTN, DM2, recent CHIARA (Cr 8 requiring dialysis and ICU stay at [facility], discharged 5/23 with Cr of 1.9) who presents with AMS and weakness. Pt's son is at bedside and states that he thought they were ready to take him home today but that caring for pt has been difficult as he cannot ambulate and gets delirious. Pt had kidney failure and encephalopathy likely 2/2 alcohol use but has not had alcohol since hospital admission. Son would like pt to get rehab.    Patient reports he was having back pain so went to [place] as that was closest to his home. His son claims his back pain was due to "kidney failure" from alcohol intoxication and required dialysis. He was in the MICU for almost a week, with last cr 1.9 and discharged earlier today. When he got home he had difficulty ambulating and was sliding off the toilet. The son felt that they could not care for him and he was at risk of falling so they brought him to [facility] for PT eval for rehab. No reported current complaints or concerns other than a dry cough.     Allergies  No Known Allergies    MEDICATIONS  (STANDING):  chlorhexidine 2% Cloths 1 Application(s) Topical daily  dextrose 5%. 1000 milliLiter(s) (50 mL/Hr) IV Continuous <Continuous>  dextrose 5%. 1000 milliLiter(s) (100 mL/Hr) IV Continuous <Continuous>  dextrose 50% Injectable 25 Gram(s) IV Push once  dextrose 50% Injectable 12.5 Gram(s) IV Push once  dextrose 50% Injectable 25 Gram(s) IV Push once  escitalopram 10 milliGRAM(s) Oral daily  folic acid 1 milliGRAM(s) Oral daily  glucagon  Injectable 1 milliGRAM(s) IntraMuscular once  insulin lispro (ADMELOG) corrective regimen sliding scale   SubCutaneous three times a day before meals  insulin lispro (ADMELOG) corrective regimen sliding scale   SubCutaneous at bedtime  levothyroxine 150 MICROGram(s) Oral daily  sevelamer carbonate 800 milliGRAM(s) Oral two times a day  simvastatin 20 milliGRAM(s) Oral at bedtime  thiamine 100 milliGRAM(s) Oral daily  traZODone 150 milliGRAM(s) Oral at bedtime    MEDICATIONS  (PRN):  acetaminophen     Tablet .. 650 milliGRAM(s) Oral every 6 hours PRN Temp greater or equal to 38C (100.4F), Mild Pain (1 - 3)  aluminum hydroxide/magnesium hydroxide/simethicone Suspension 30 milliLiter(s) Oral every 4 hours PRN Dyspepsia  dextrose Oral Gel 15 Gram(s) Oral once PRN Blood Glucose LESS THAN 70 milliGRAM(s)/deciliter  melatonin 3 milliGRAM(s) Oral at bedtime PRN Insomnia  ondansetron Injectable 4 milliGRAM(s) IV Push every 8 hours PRN Nausea and/or Vomiting      PAST MEDICAL & SURGICAL HISTORY:  HTN (hypertension)    DM (diabetes mellitus)      FAMILY HISTORY: non-contributory      SOCIAL HISTORY: No EtOH, no tobacco    REVIEW OF SYSTEMS:    CONSTITUTIONAL: (+) weakness, AMS  EYES/ENT: No visual changes;  no throat pain   NECK: No pain or stiffness  RESPIRATORY: no sob  CARDIOVASCULAR: No chest pain or palpitations  GASTROINTESTINAL: No abdominal or epigastric pain. No N/V/D/C  GENITOURINARY: No dysuria, change in frequency or hematuria  NEUROLOGICAL: No numbness or weakness  SKIN: No itching, burning, rashes, or lesions   MSK: no joint pain  Allergy: no urticaria         T(F): 98.9 (05-26-22 @ 07:58), Max: 98.9 (05-26-22 @ 07:58)  HR: 96 (05-26-22 @ 07:58)  BP: 111/73 (05-26-22 @ 07:58)  RR: 18 (05-26-22 @ 07:58)  SpO2: 97% (05-26-22 @ 07:58)  Wt(kg): --    GENERAL: NAD  HEENT: EOMI, MMM, n  Pulm: no inc wob  CV: RRR  ABDOMEN: soft, nt, nd  MSK: nl ROM  EXTREMITIES:  no appreciable edema in b/l LE  SKIN: warm and dry, no visible rash                          8.7    8.16  )-----------( 51       ( 26 May 2022 07:12 )             26.8       05-26    139  |  98  |  25<H>  ----------------------------<  155<H>  3.6   |  25  |  1.78<H>    Ca    9.8      26 May 2022 07:19    TPro  7.3  /  Alb  4.0  /  TBili  0.5  /  DBili  x   /  AST  25  /  ALT  46<H>  /  AlkPhos  95  05-25      Radiology  < from: CT Head No Cont (05.23.22 @ 22:09) >  The calvarium is intact.    IMPRESSION:  No acute intracranial hemorrhage or mass effect.    Mild cerebral atrophy and microvascular changes.    --- End of Report ---    < end of copied text >

## 2022-05-26 NOTE — PROGRESS NOTE ADULT - PROBLEM SELECTOR PLAN 3
history obtained from patient's son - began during last hospitalization after HD started, HIT excluded, possibly mechanical/shearing/clumping - has been in 40-60 range for some time now and is not improving above then.   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

## 2022-05-26 NOTE — PROGRESS NOTE ADULT - PROBLEM SELECTOR PLAN 6
Monitor for signs or symptoms of withdrawal. No recent alcohol use as per patient and family as he was just at [facility] for the past week.   c/w folic acid and thiamine
Monitor for signs or symptoms of withdrawal. No recent alcohol use as per patient and family as he was just at [facility] for the past week.   c/w folic acid and thiamine

## 2022-05-26 NOTE — PROGRESS NOTE ADULT - SUBJECTIVE AND OBJECTIVE BOX
[facility] Division of Hospital Medicine  Elio Maurer MD  Contact M-F, 8A-5P through [employer] Teams  Other Times:  [phone]    Patient is a 71y old  Male who presents with a chief complaint of Weakness (26 May 2022 13:04)      SUBJECTIVE / OVERNIGHT EVENTS:  ADDITIONAL REVIEW OF SYSTEMS:    MEDICATIONS  (STANDING):  chlorhexidine 2% Cloths 1 Application(s) Topical daily  dextrose 5%. 1000 milliLiter(s) (50 mL/Hr) IV Continuous <Continuous>  dextrose 5%. 1000 milliLiter(s) (100 mL/Hr) IV Continuous <Continuous>  dextrose 50% Injectable 25 Gram(s) IV Push once  dextrose 50% Injectable 12.5 Gram(s) IV Push once  dextrose 50% Injectable 25 Gram(s) IV Push once  escitalopram 10 milliGRAM(s) Oral daily  folic acid 1 milliGRAM(s) Oral daily  glucagon  Injectable 1 milliGRAM(s) IntraMuscular once  insulin lispro (ADMELOG) corrective regimen sliding scale   SubCutaneous three times a day before meals  insulin lispro (ADMELOG) corrective regimen sliding scale   SubCutaneous at bedtime  levothyroxine 150 MICROGram(s) Oral daily  sevelamer carbonate 800 milliGRAM(s) Oral two times a day  simvastatin 20 milliGRAM(s) Oral at bedtime  thiamine 100 milliGRAM(s) Oral daily  traZODone 150 milliGRAM(s) Oral at bedtime    MEDICATIONS  (PRN):  acetaminophen     Tablet .. 650 milliGRAM(s) Oral every 6 hours PRN Temp greater or equal to 38C (100.4F), Mild Pain (1 - 3)  aluminum hydroxide/magnesium hydroxide/simethicone Suspension 30 milliLiter(s) Oral every 4 hours PRN Dyspepsia  dextrose Oral Gel 15 Gram(s) Oral once PRN Blood Glucose LESS THAN 70 milliGRAM(s)/deciliter  melatonin 3 milliGRAM(s) Oral at bedtime PRN Insomnia  ondansetron Injectable 4 milliGRAM(s) IV Push every 8 hours PRN Nausea and/or Vomiting      CAPILLARY BLOOD GLUCOSE      POCT Blood Glucose.: 155 mg/dL (26 May 2022 11:28)  POCT Blood Glucose.: 144 mg/dL (26 May 2022 08:05)  POCT Blood Glucose.: 135 mg/dL (25 May 2022 21:41)  POCT Blood Glucose.: 135 mg/dL (25 May 2022 17:13)    I&O's Summary    25 May 2022 07:01  -  26 May 2022 07:00  --------------------------------------------------------  IN: 740 mL / OUT: 0 mL / NET: 740 mL    26 May 2022 07:01  -  26 May 2022 13:21  --------------------------------------------------------  IN: 220 mL / OUT: 0 mL / NET: 220 mL        PHYSICAL EXAM:  Vital Signs Last 24 Hrs  T(C): 37.2 (26 May 2022 07:58), Max: 37.2 (26 May 2022 07:58)  T(F): 98.9 (26 May 2022 07:58), Max: 98.9 (26 May 2022 07:58)  HR: 96 (26 May 2022 07:58) (96 - 101)  BP: 111/73 (26 May 2022 07:58) (111/73 - 124/73)  BP(mean): --  RR: 18 (26 May 2022 07:58) (18 - 18)  SpO2: 97% (26 May 2022 07:58) (96% - 97%)  CONSTITUTIONAL: NAD, well-developed, well-groomed  EYES: conjunctiva and sclera clear  ENMT: Moist oral mucosa, no pharyngeal injection or exudates; normal dentition  RESPIRATORY: Normal respiratory effort; lungs are clear to auscultation bilaterally  CARDIOVASCULAR: Regular rate and rhythm, normal S1 and S2, no murmur; No lower extremity edema;   ABDOMEN: Nontender to palpation, normoactive bowel sounds, no rebound/guarding;  MUSCULOSKELETAL:  ADAMS no tenderness  PSYCH: A+O to person, place, and time; affect appropriate  NEUROLOGY: grossly nonfocal upper/lower extremity, conversational  SKIN: No rashes; no palpable lesions    LABS:                        8.7    8.16  )-----------( 51       ( 26 May 2022 07:12 )             26.8     05-26    139  |  98  |  25<H>  ----------------------------<  155<H>  3.6   |  25  |  1.78<H>    Ca    9.8      26 May 2022 07:19    TPro  7.3  /  Alb  4.0  /  TBili  0.5  /  DBili  x   /  AST  25  /  ALT  46<H>  /  AlkPhos  95  05-25              Culture - Urine (collected 24 May 2022 04:42)  Source: Clean Catch Clean Catch (Midstream)  Final Report (26 May 2022 12:34):    50,000 - 99,000 CFU/mL Escherichia coli  Organism: Escherichia coli (26 May 2022 12:34)  Organism: Escherichia coli (26 May 2022 12:34)        RADIOLOGY & ADDITIONAL TESTS:  Results Reviewed:   Imaging Personally Reviewed:  Electrocardiogram Personally Reviewed:    COORDINATION OF CARE:  Care Discussed with Consultants/Other Providers [Y/N]:  Prior or Outpatient Records Reviewed [Y/N]:   [facility] Division of Hospital Medicine  Elio Maurer MD  Contact M-F, 8A-5P through [employer] Teams  Other Times:  [phone]    Patient is a 71y old  Male who presents with a chief complaint of Weakness (26 May 2022 13:04)      SUBJECTIVE / OVERNIGHT EVENTS: no events no new complaints  ADDITIONAL REVIEW OF SYSTEMS:    MEDICATIONS  (STANDING):  chlorhexidine 2% Cloths 1 Application(s) Topical daily  dextrose 5%. 1000 milliLiter(s) (50 mL/Hr) IV Continuous <Continuous>  dextrose 5%. 1000 milliLiter(s) (100 mL/Hr) IV Continuous <Continuous>  dextrose 50% Injectable 25 Gram(s) IV Push once  dextrose 50% Injectable 12.5 Gram(s) IV Push once  dextrose 50% Injectable 25 Gram(s) IV Push once  escitalopram 10 milliGRAM(s) Oral daily  folic acid 1 milliGRAM(s) Oral daily  glucagon  Injectable 1 milliGRAM(s) IntraMuscular once  insulin lispro (ADMELOG) corrective regimen sliding scale   SubCutaneous three times a day before meals  insulin lispro (ADMELOG) corrective regimen sliding scale   SubCutaneous at bedtime  levothyroxine 150 MICROGram(s) Oral daily  sevelamer carbonate 800 milliGRAM(s) Oral two times a day  simvastatin 20 milliGRAM(s) Oral at bedtime  thiamine 100 milliGRAM(s) Oral daily  traZODone 150 milliGRAM(s) Oral at bedtime    MEDICATIONS  (PRN):  acetaminophen     Tablet .. 650 milliGRAM(s) Oral every 6 hours PRN Temp greater or equal to 38C (100.4F), Mild Pain (1 - 3)  aluminum hydroxide/magnesium hydroxide/simethicone Suspension 30 milliLiter(s) Oral every 4 hours PRN Dyspepsia  dextrose Oral Gel 15 Gram(s) Oral once PRN Blood Glucose LESS THAN 70 milliGRAM(s)/deciliter  melatonin 3 milliGRAM(s) Oral at bedtime PRN Insomnia  ondansetron Injectable 4 milliGRAM(s) IV Push every 8 hours PRN Nausea and/or Vomiting      CAPILLARY BLOOD GLUCOSE      POCT Blood Glucose.: 155 mg/dL (26 May 2022 11:28)  POCT Blood Glucose.: 144 mg/dL (26 May 2022 08:05)  POCT Blood Glucose.: 135 mg/dL (25 May 2022 21:41)  POCT Blood Glucose.: 135 mg/dL (25 May 2022 17:13)    I&O's Summary    25 May 2022 07:01  -  26 May 2022 07:00  --------------------------------------------------------  IN: 740 mL / OUT: 0 mL / NET: 740 mL    26 May 2022 07:01  -  26 May 2022 13:21  --------------------------------------------------------  IN: 220 mL / OUT: 0 mL / NET: 220 mL        PHYSICAL EXAM:  Vital Signs Last 24 Hrs  T(C): 37.2 (26 May 2022 07:58), Max: 37.2 (26 May 2022 07:58)  T(F): 98.9 (26 May 2022 07:58), Max: 98.9 (26 May 2022 07:58)  HR: 96 (26 May 2022 07:58) (96 - 101)  BP: 111/73 (26 May 2022 07:58) (111/73 - 124/73)  BP(mean): --  RR: 18 (26 May 2022 07:58) (18 - 18)  SpO2: 97% (26 May 2022 07:58) (96% - 97%)  CONSTITUTIONAL: NAD, well-developed, well-groomed  EYES: conjunctiva and sclera clear  ENMT: Moist oral mucosa, no pharyngeal injection or exudates; normal dentition  RESPIRATORY: Normal respiratory effort; lungs are clear to auscultation bilaterally  CARDIOVASCULAR: Regular rate and rhythm, normal S1 and S2, no murmur; No lower extremity edema;   ABDOMEN: Nontender to palpation, normoactive bowel sounds, no rebound/guarding;  MUSCULOSKELETAL:  ADAMS no tenderness  PSYCH: A+O to person, place, and time; affect appropriate  NEUROLOGY: grossly nonfocal upper/lower extremity, conversational  SKIN: No rashes; no palpable lesions    LABS:                        8.7    8.16  )-----------( 51       ( 26 May 2022 07:12 )             26.8     05-26    139  |  98  |  25<H>  ----------------------------<  155<H>  3.6   |  25  |  1.78<H>    Ca    9.8      26 May 2022 07:19    TPro  7.3  /  Alb  4.0  /  TBili  0.5  /  DBili  x   /  AST  25  /  ALT  46<H>  /  AlkPhos  95  05-25              Culture - Urine (collected 24 May 2022 04:42)  Source: Clean Catch Clean Catch (Midstream)  Final Report (26 May 2022 12:34):    50,000 - 99,000 CFU/mL Escherichia coli  Organism: Escherichia coli (26 May 2022 12:34)  Organism: Escherichia coli (26 May 2022 12:34)        RADIOLOGY & ADDITIONAL TESTS:  Results Reviewed:   Imaging Personally Reviewed:  Electrocardiogram Personally Reviewed:    COORDINATION OF CARE:  Care Discussed with Consultants/Other Providers [Y/N]:  Prior or Outpatient Records Reviewed [Y/N]:

## 2022-05-26 NOTE — PROGRESS NOTE ADULT - ASSESSMENT
72 y/o M w/ hx opf HTN, DM Type 2, alcohol abuse brought in by family for weakness, difficulty ambulating, confusion, and need for rehab placement. 
70 y/o M w/ hx opf HTN, DM Type 2, alcohol abuse brought in by family for weakness, difficulty ambulating, confusion, and need for rehab placement.

## 2022-05-26 NOTE — PROGRESS NOTE ADULT - PROBLEM SELECTOR PLAN 2
pt required HD at [facility] in recent hospitalization but was able to be weaned off  purported baseline Cr at d/c was 1.9 which was same as admission here and is now decreasing  con't to monitor  outpatient renal f/u
pt required HD at [facility] in recent hospitalization but was able to be weaned off  purported baseline Cr at d/c was 1.9 which was same as admission here and is now decreasing  con't to monitor  outpatient renal f/u

## 2022-05-26 NOTE — CONSULT NOTE ADULT - ASSESSMENT
71M with HTN, DM2, recent CHIARA (Cr 8 requiring dialysis and ICU stay at [facility], discharged from [facility] on 5/23 with Cr of 1.9) who presents with AMS and weakness. Hematology consulted for normocytic anemia and thrombocytopenia     #normocytic anemia  -h/h slowly downtrending this admission   -check retic, iron studies, B12, folate, LDH, hapto     #thrombocytopenia   -64>>44>>51 this admission. per HIE, plts were normal in April 2022  -smear reviewed: RBCs mostly normocytic, normochromic. Scant schistocytes so no TMA/TTP.  Dec platelets with some large platelets.   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 with plt count of 51, would continue to trend platelets and patient can f/u at [facility] to continue work-up and start treatment for potential ITP if warranted     d/w Dr. Christoph Woodall Heme/onc PGY5

## 2022-05-26 NOTE — DISCHARGE NOTE NURSING/CASE MANAGEMENT/SOCIAL WORK - PATIENT PORTAL LINK FT
You can access the FollowMyHealth Patient Portal offered by [facility] by registering at the following website: [email or web address]. By joining [employer]’s FollowMyHealth portal, you will also be able to view your health information using other applications (apps) compatible with our system.

## 2022-05-26 NOTE — DISCHARGE NOTE PROVIDER - NSDCCPCAREPLAN_GEN_ALL_CORE_FT
PRINCIPAL DISCHARGE DIAGNOSIS  Diagnosis: Weakness  Assessment and Plan of Treatment: resolved      SECONDARY DISCHARGE DIAGNOSES  Diagnosis: Alcohol abuse  Assessment and Plan of Treatment: stable, cont thiamine, folic acid, multivitamin, abstain from drinking alcohol    Diagnosis: Thrombocytopenia  Assessment and Plan of Treatment: stable, f/up with hematology    Diagnosis: Essential hypertension  Assessment and Plan of Treatment: controlled, cont current home meds    Diagnosis: Type 2 diabetes mellitus with hyperglycemia  Assessment and Plan of Treatment: controlled, cont current oral home meds    Diagnosis: Renal failure, acute  Assessment and Plan of Treatment: stable, you need a Nephrologist as op

## 2022-05-26 NOTE — DISCHARGE NOTE PROVIDER - PROVIDER TOKENS
PROVIDER:[TOKEN:[[id number]]],PROVIDER:[TOKEN:[[id number]]] PROVIDER:[TOKEN:[[id number]]],PROVIDER:[TOKEN:[[id number]]],PROVIDER:[TOKEN:[[id number]]] PROVIDER:[TOKEN:[[id number]]],PROVIDER:[TOKEN:[[id number]]]

## 2022-05-26 NOTE — DISCHARGE NOTE NURSING/CASE MANAGEMENT/SOCIAL WORK - NSDCPEFALRISK_GEN_ALL_CORE
For information on Fall & Injury Prevention, visit: [email or web address] OR  [email or web address] OR  https://www.cdc.gov/steadi/patient.html

## 2022-05-26 NOTE — DISCHARGE NOTE PROVIDER - NSDCFUSCHEDAPPT_GEN_ALL_CORE_FT
[facility]  [address]  Scheduled Appointment: 05/26/2022    [facility]  [address]  Scheduled Appointment: 05/31/2022    Radha Christopher  [facility]  [address]  Scheduled Appointment: 06/01/2022    [facility]  [address]  Scheduled Appointment: 06/02/2022     [facility]  [address]  Scheduled Appointment: 06/01/2022     [facility]  [address]  Scheduled Appointment: 09/28/2022

## 2022-05-27 LAB — ANA TITR SER: NEGATIVE — SIGNIFICANT CHANGE UP

## 2022-05-31 ENCOUNTER — APPOINTMENT (OUTPATIENT)
Dept: DERMATOLOGY | Facility: CLINIC | Age: 72
End: 2022-05-31

## 2022-06-01 ENCOUNTER — NON-APPOINTMENT (OUTPATIENT)
Age: 72
End: 2022-06-01

## 2022-06-01 ENCOUNTER — APPOINTMENT (OUTPATIENT)
Dept: DERMATOLOGY | Facility: CLINIC | Age: 72
End: 2022-06-01

## 2022-06-02 ENCOUNTER — APPOINTMENT (OUTPATIENT)
Dept: DERMATOLOGY | Facility: CLINIC | Age: 72
End: 2022-06-02

## 2022-06-03 ENCOUNTER — APPOINTMENT (OUTPATIENT)
Dept: INTERNAL MEDICINE | Facility: CLINIC | Age: 72
End: 2022-06-03
Payer: MEDICARE

## 2022-06-03 PROCEDURE — 36415 COLL VENOUS BLD VENIPUNCTURE: CPT

## 2022-06-04 ENCOUNTER — NON-APPOINTMENT (OUTPATIENT)
Age: 72
End: 2022-06-04

## 2022-06-06 ENCOUNTER — NON-APPOINTMENT (OUTPATIENT)
Age: 72
End: 2022-06-06

## 2022-06-06 RX ORDER — IBUPROFEN 800 MG/1
800 TABLET, FILM COATED ORAL EVERY 8 HOURS
Refills: 0 | Status: DISCONTINUED | COMMUNITY
Start: 2018-07-30 | End: 2022-06-06

## 2022-06-06 RX ORDER — CHLORPHENIRAMINE MALEATE 4 MG/1
4 TABLET ORAL DAILY
Qty: 90 | Refills: 0 | Status: DISCONTINUED | COMMUNITY
Start: 2022-03-23 | End: 2022-06-06

## 2022-06-06 RX ORDER — LEVOCETIRIZINE DIHYDROCHLORIDE 5 MG/1
5 TABLET ORAL
Qty: 60 | Refills: 2 | Status: DISCONTINUED | COMMUNITY
Start: 2022-04-19 | End: 2022-06-06

## 2022-06-07 ENCOUNTER — APPOINTMENT (OUTPATIENT)
Dept: INTERNAL MEDICINE | Facility: CLINIC | Age: 72
End: 2022-06-07
Payer: MEDICARE

## 2022-06-07 VITALS
DIASTOLIC BLOOD PRESSURE: 80 MMHG | BODY MASS INDEX: 25.11 KG/M2 | SYSTOLIC BLOOD PRESSURE: 120 MMHG | HEIGHT: 67 IN | WEIGHT: 160 LBS

## 2022-06-07 DIAGNOSIS — R60.0 LOCALIZED EDEMA: ICD-10-CM

## 2022-06-07 DIAGNOSIS — N39.0 URINARY TRACT INFECTION, SITE NOT SPECIFIED: ICD-10-CM

## 2022-06-07 LAB
25(OH)D3 SERPL-MCNC: 45.7 NG/ML
ALBUMIN SERPL ELPH-MCNC: 4.1 G/DL
ALP BLD-CCNC: 84 U/L
ALT SERPL-CCNC: 18 U/L
ANION GAP SERPL CALC-SCNC: 14 MMOL/L
APPEARANCE: CLEAR
AST SERPL-CCNC: 29 U/L
BASOPHILS # BLD AUTO: 0.09 K/UL
BASOPHILS NFR BLD AUTO: 1.5 %
BILIRUB SERPL-MCNC: 0.3 MG/DL
BILIRUBIN URINE: NEGATIVE
BLOOD URINE: NEGATIVE
BUN SERPL-MCNC: 24 MG/DL
CALCIUM SERPL-MCNC: 10.2 MG/DL
CHLORIDE SERPL-SCNC: 100 MMOL/L
CHOLEST SERPL-MCNC: 141 MG/DL
CO2 SERPL-SCNC: 29 MMOL/L
COLOR: YELLOW
CREAT SERPL-MCNC: 1.82 MG/DL
EGFR: 40 ML/MIN/1.73M2
EOSINOPHIL # BLD AUTO: 0.46 K/UL
EOSINOPHIL NFR BLD AUTO: 7.9 %
ESTIMATED AVERAGE GLUCOSE: 114 MG/DL
GLUCOSE QUALITATIVE U: NEGATIVE
GLUCOSE SERPL-MCNC: 89 MG/DL
HBA1C MFR BLD HPLC: 5.6 %
HCT VFR BLD CALC: 28.4 %
HDLC SERPL-MCNC: 59 MG/DL
HGB BLD-MCNC: 8.9 G/DL
IMM GRANULOCYTES NFR BLD AUTO: 0.2 %
KETONES URINE: NEGATIVE
LDLC SERPL CALC-MCNC: 42 MG/DL
LEUKOCYTE ESTERASE URINE: NEGATIVE
LYMPHOCYTES # BLD AUTO: 2.18 K/UL
LYMPHOCYTES NFR BLD AUTO: 37.5 %
MAN DIFF?: NORMAL
MCHC RBC-ENTMCNC: 30.1 PG
MCHC RBC-ENTMCNC: 31.3 GM/DL
MCV RBC AUTO: 95.9 FL
MONOCYTES # BLD AUTO: 0.63 K/UL
MONOCYTES NFR BLD AUTO: 10.8 %
NEUTROPHILS # BLD AUTO: 2.45 K/UL
NEUTROPHILS NFR BLD AUTO: 42.1 %
NITRITE URINE: NEGATIVE
NONHDLC SERPL-MCNC: 83 MG/DL
PH URINE: 8
PLATELET # BLD AUTO: 193 K/UL
POTASSIUM SERPL-SCNC: 5 MMOL/L
PROT SERPL-MCNC: 7.6 G/DL
PROTEIN URINE: NORMAL
PSA SERPL-MCNC: 0.6 NG/ML
RBC # BLD: 2.96 M/UL
RBC # FLD: 14.6 %
SODIUM SERPL-SCNC: 143 MMOL/L
SPECIFIC GRAVITY URINE: 1.02
T4 FREE SERPL-MCNC: 1.9 NG/DL
TRIGL SERPL-MCNC: 203 MG/DL
TSH SERPL-ACNC: 1.13 UIU/ML
UROBILINOGEN URINE: NORMAL
WBC # FLD AUTO: 5.82 K/UL

## 2022-06-07 PROCEDURE — 99495 TRANSJ CARE MGMT MOD F2F 14D: CPT

## 2022-06-07 RX ORDER — FAMOTIDINE 20 MG/1
20 TABLET, FILM COATED ORAL DAILY
Qty: 90 | Refills: 1 | Status: DISCONTINUED | COMMUNITY
Start: 2019-05-31 | End: 2022-06-07

## 2022-06-07 RX ORDER — METHENAMINE HIPPURATE 1 G/1
1 TABLET ORAL
Qty: 30 | Refills: 0 | Status: DISCONTINUED | COMMUNITY
Start: 2020-12-09 | End: 2022-06-07

## 2022-06-07 RX ORDER — FUROSEMIDE 20 MG/1
20 TABLET ORAL DAILY
Qty: 14 | Refills: 0 | Status: ACTIVE | COMMUNITY
Start: 2022-06-07 | End: 1900-01-01

## 2022-06-07 RX ORDER — TAMSULOSIN HYDROCHLORIDE 0.4 MG/1
0.4 CAPSULE ORAL
Qty: 30 | Refills: 6 | Status: DISCONTINUED | COMMUNITY
Start: 2021-12-02 | End: 2022-06-07

## 2022-06-08 PROBLEM — R60.0 BILATERAL LOWER EXTREMITY EDEMA: Status: ACTIVE | Noted: 2022-06-07

## 2022-06-08 PROBLEM — N39.0 ACUTE URINARY TRACT INFECTION: Status: RESOLVED | Noted: 2021-07-30 | Resolved: 2022-06-08

## 2022-06-08 NOTE — HISTORY OF PRESENT ILLNESS
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

## 2022-06-08 NOTE — PHYSICAL EXAM
[No Acute Distress] : no acute distress [Well Nourished] : well nourished [No Accessory Muscle Use] : no accessory muscle use [Clear to Auscultation] : lungs were clear to auscultation bilaterally [Regular Rhythm] : with a regular rhythm [Normal S1, S2] : normal S1 and S2 [No Murmur] : no murmur heard [Soft] : abdomen soft [Non Tender] : non-tender [Non-distended] : non-distended [No Masses] : no abdominal mass palpated [No HSM] : no HSM [Normal Bowel Sounds] : normal bowel sounds [Normal Affect] : the affect was normal [Alert and Oriented x3] : oriented to person, place, and time [Normal Insight/Judgement] : insight and judgment were intact [de-identified] : 1+ edema R>L Lower extremity

## 2022-06-14 ENCOUNTER — APPOINTMENT (OUTPATIENT)
Dept: NEPHROLOGY | Facility: CLINIC | Age: 72
End: 2022-06-14
Payer: MEDICARE

## 2022-06-14 VITALS
HEIGHT: 67 IN | SYSTOLIC BLOOD PRESSURE: 108 MMHG | DIASTOLIC BLOOD PRESSURE: 68 MMHG | BODY MASS INDEX: 25.43 KG/M2 | HEART RATE: 90 BPM | WEIGHT: 162 LBS

## 2022-06-14 VITALS
DIASTOLIC BLOOD PRESSURE: 68 MMHG | BODY MASS INDEX: 25.43 KG/M2 | TEMPERATURE: 97.5 F | HEIGHT: 67 IN | HEART RATE: 100 BPM | WEIGHT: 162 LBS | OXYGEN SATURATION: 97 % | SYSTOLIC BLOOD PRESSURE: 103 MMHG

## 2022-06-14 DIAGNOSIS — I10 ESSENTIAL (PRIMARY) HYPERTENSION: ICD-10-CM

## 2022-06-14 PROCEDURE — 99205 OFFICE O/P NEW HI 60 MIN: CPT

## 2022-06-14 RX ORDER — METFORMIN HYDROCHLORIDE 500 MG/1
500 TABLET, COATED ORAL
Refills: 0 | Status: DISCONTINUED | COMMUNITY
Start: 2022-06-07 | End: 2022-06-14

## 2022-06-14 RX ORDER — MUPIROCIN 20 MG/G
2 OINTMENT TOPICAL
Qty: 22 | Refills: 0 | Status: ACTIVE | COMMUNITY
Start: 2021-12-30

## 2022-06-15 NOTE — HISTORY OF PRESENT ILLNESS
[FreeTextEntry1] : 70 yo male with history of DM, HTN, here for post hospital visit for CHIARA and acidosis. \par He is accompanied by his two sons. \par He had in the last 2 years abused alcohol and went to [facility] for CHIARA and lactic acidosis requiring CRRT. He was taking metformin at the time as well.\par He was taken off HD and renal function improved. He did not require further dialysis. His Cr was 1.8-1.9 baseline prior to hospital was 1.2.\par He was given meds for withdrawal, he was monitored and sent home but went to [facility] bc he required further care and rehab. \par Since his discharge he had seen PMD Dr Lou \par Started on lasix for worsening lower ext edema\par Now resolved\par Appetite has improved\par No further drinking \par

## 2022-06-15 NOTE — REASON FOR VISIT
[Consultation] : a consultation visit [Family Member] : family member [FreeTextEntry1] : CHIARA acidosis

## 2022-06-15 NOTE — CONSULT LETTER
[Dear  ___] : Dear  [unfilled], [Consult Letter:] : I had the pleasure of evaluating your patient, [unfilled]. [( Thank you for referring [unfilled] for consultation for _____ )] : Thank you for referring [unfilled] for consultation for [unfilled] [Please see my note below.] : Please see my note below. [Consult Closing:] : Thank you very much for allowing me to participate in the care of this patient.  If you have any questions, please do not hesitate to contact me. [Sincerely,] : Sincerely, [FreeTextEntry3] : Piper Kwok MD\par  [facility] [employer] [facility]\par Division of Nephrology and Hypertension\par \par

## 2022-06-15 NOTE — ASSESSMENT
[FreeTextEntry1] : 72 yo male with DM, depression ,alcoholism, admitted for CHIARA and acidosis, dehydration.\par Had renal replacement therapy likely for lactic acidosis from metformin and CHIARA.\par Cr down to 1.8. \par Would keep off metformin for now. His A1c is overall improved and now off alcohol. \par Maintain hydration and po intake for nutrition. \par Stop lasix for now. \par Repeat labs next week. Hope to expect further improvement of his renal function. \par Avoid NSAIDs\par Anemia: May need GI eval. Follow iron studies and repeat CBC. Less likely renal related. \par All questions were answered.\par Followup based on repeat labs. \par Sons present and agreeable to plan

## 2022-06-15 NOTE — PHYSICAL EXAM
[General Appearance - Alert] : alert [General Appearance - In No Acute Distress] : in no acute distress [Sclera] : the sclera and conjunctiva were normal [Outer Ear] : the ears and nose were normal in appearance [Neck Appearance] : the appearance of the neck was normal [Auscultation Breath Sounds / Voice Sounds] : lungs were clear to auscultation bilaterally [Heart Rate And Rhythm] : heart rate was normal and rhythm regular [Heart Sounds] : normal S1 and S2 [Murmurs] : no murmurs [Heart Sounds Pericardial Friction Rub] : no pericardial rub [Edema] : there was no peripheral edema [Bowel Sounds] : normal bowel sounds [Abdomen Soft] : soft [No CVA Tenderness] : no ~M costovertebral angle tenderness [Involuntary Movements] : no involuntary movements were seen [] : no rash [No Focal Deficits] : no focal deficits [Oriented To Time, Place, And Person] : oriented to person, place, and time [Affect] : the affect was normal [Mood] : the mood was normal

## 2022-06-23 ENCOUNTER — OUTPATIENT (OUTPATIENT)
Dept: OUTPATIENT SERVICES | Facility: HOSPITAL | Age: 72
LOS: 1 days | Discharge: HOME | End: 2022-06-23

## 2022-06-23 DIAGNOSIS — D64.9 ANEMIA, UNSPECIFIED: ICD-10-CM

## 2022-06-23 LAB
ALBUMIN SERPL ELPH-MCNC: 4.6 G/DL
ALP BLD-CCNC: 57 U/L
ALT SERPL-CCNC: 24 U/L
ANION GAP SERPL CALC-SCNC: 13 MMOL/L
APPEARANCE: CLEAR
AST SERPL-CCNC: 29 U/L
BACTERIA: NEGATIVE
BASOPHILS # BLD AUTO: 0.08 K/UL
BASOPHILS NFR BLD AUTO: 1.4 %
BILIRUB SERPL-MCNC: 0.6 MG/DL
BILIRUBIN URINE: NEGATIVE
BLOOD URINE: NEGATIVE
BUN SERPL-MCNC: 31 MG/DL
CALCIUM SERPL-MCNC: 10.3 MG/DL
CHLORIDE SERPL-SCNC: 101 MMOL/L
CO2 SERPL-SCNC: 24 MMOL/L
COLOR: YELLOW
CREAT SERPL-MCNC: 1.29 MG/DL
EGFR: 60 ML/MIN/1.73M2
EOSINOPHIL # BLD AUTO: 0.39 K/UL
EOSINOPHIL NFR BLD AUTO: 7 %
FERRITIN SERPL-MCNC: 88 NG/ML
FOLATE SERPL-MCNC: >20 NG/ML
GLUCOSE QUALITATIVE U: NEGATIVE
GLUCOSE SERPL-MCNC: 90 MG/DL
HCT VFR BLD CALC: 34.7 %
HGB BLD-MCNC: 10.5 G/DL
HYALINE CASTS: 0 /LPF
IMM GRANULOCYTES NFR BLD AUTO: 0.2 %
IRON SATN MFR SERPL: 18 %
IRON SERPL-MCNC: 80 UG/DL
KETONES URINE: NEGATIVE
LEUKOCYTE ESTERASE URINE: NEGATIVE
LYMPHOCYTES # BLD AUTO: 2.48 K/UL
LYMPHOCYTES NFR BLD AUTO: 44.7 %
MAGNESIUM SERPL-MCNC: 2.1 MG/DL
MAN DIFF?: NORMAL
MCHC RBC-ENTMCNC: 28.8 PG
MCHC RBC-ENTMCNC: 30.3 GM/DL
MCV RBC AUTO: 95.3 FL
MICROSCOPIC-UA: NORMAL
MONOCYTES # BLD AUTO: 0.45 K/UL
MONOCYTES NFR BLD AUTO: 8.1 %
NEUTROPHILS # BLD AUTO: 2.14 K/UL
NEUTROPHILS NFR BLD AUTO: 38.6 %
NITRITE URINE: NEGATIVE
PH URINE: 6
PHOSPHATE SERPL-MCNC: 5 MG/DL
PLATELET # BLD AUTO: 219 K/UL
POTASSIUM SERPL-SCNC: 4.8 MMOL/L
PROT SERPL-MCNC: 7.9 G/DL
PROTEIN URINE: NEGATIVE
RBC # BLD: 3.64 M/UL
RBC # FLD: 14.6 %
RED BLOOD CELLS URINE: 0 /HPF
SODIUM ?TM SUB UR QN: 51 MMOL/L
SODIUM SERPL-SCNC: 139 MMOL/L
SPECIFIC GRAVITY URINE: 1.01
SQUAMOUS EPITHELIAL CELLS: 0 /HPF
TIBC SERPL-MCNC: 431 UG/DL
UIBC SERPL-MCNC: 351 UG/DL
URATE SERPL-MCNC: 6.2 MG/DL
UROBILINOGEN URINE: NORMAL
VIT B12 SERPL-MCNC: >2000 PG/ML
WBC # FLD AUTO: 5.55 K/UL
WHITE BLOOD CELLS URINE: 1 /HPF

## 2022-06-29 ENCOUNTER — APPOINTMENT (OUTPATIENT)
Dept: HEMATOLOGY ONCOLOGY | Facility: CLINIC | Age: 72
End: 2022-06-29

## 2022-06-29 ENCOUNTER — NON-APPOINTMENT (OUTPATIENT)
Age: 72
End: 2022-06-29

## 2022-06-29 VITALS
DIASTOLIC BLOOD PRESSURE: 72 MMHG | WEIGHT: 156.53 LBS | SYSTOLIC BLOOD PRESSURE: 115 MMHG | HEART RATE: 85 BPM | OXYGEN SATURATION: 100 % | TEMPERATURE: 98 F | RESPIRATION RATE: 16 BRPM | BODY MASS INDEX: 24.86 KG/M2 | HEIGHT: 66.73 IN

## 2022-06-29 DIAGNOSIS — Z87.891 PERSONAL HISTORY OF NICOTINE DEPENDENCE: ICD-10-CM

## 2022-06-29 DIAGNOSIS — D64.9 ANEMIA, UNSPECIFIED: ICD-10-CM

## 2022-06-29 PROCEDURE — 99215 OFFICE O/P EST HI 40 MIN: CPT

## 2022-06-29 RX ORDER — GABAPENTIN 300 MG/1
300 CAPSULE ORAL
Qty: 180 | Refills: 2 | Status: DISCONTINUED | COMMUNITY
Start: 2022-06-07 | End: 2022-06-29

## 2022-06-29 RX ORDER — TRAZODONE HYDROCHLORIDE 150 MG/1
150 TABLET ORAL
Qty: 14 | Refills: 0 | Status: DISCONTINUED | COMMUNITY
Start: 2022-05-31 | End: 2022-06-29

## 2022-06-29 RX ORDER — CLOBETASOL PROPIONATE 0.5 MG/G
0.05 OINTMENT TOPICAL
Qty: 1 | Refills: 0 | Status: DISCONTINUED | COMMUNITY
Start: 2022-03-23 | End: 2022-06-29

## 2022-06-29 RX ORDER — TRAZODONE HYDROCHLORIDE 100 MG/1
100 TABLET ORAL AT BEDTIME
Refills: 0 | Status: DISCONTINUED | COMMUNITY
Start: 2020-12-11 | End: 2022-06-29

## 2022-06-29 NOTE — HISTORY OF PRESENT ILLNESS
[Date: ____________] : Patient's last distress assessment performed on [unfilled]. [0 - No Distress] : Distress Level: 0 [90: Able to carry normal activity; minor signs or symptoms of disease.] : 90: Able to carry normal activity; minor signs or symptoms of disease.  [ECOG Performance Status: 1 - Restricted in physically strenuous activity but ambulatory and able to carry out work of a light or sedentary nature] : Performance Status: 1 - Restricted in physically strenuous activity but ambulatory and able to carry out work of a light or sedentary nature, e.g., light house work, office work [de-identified] : Michael Fernando is a 71 year old male referred to us 06/2022 by Dr Kwok of nephrology for evaluation of anemia; \par The patient was is seen by nephrology service: 06/23/2022  CBC WBC 5.55 HGB 10.5 MCV 95 MCH 34.7 MCHC 28.8  000\par ferritin 88; iron 80 \par CMP BUN 90  creatine 1.29 normal liver function studies and albumin 4.6\par The patient was seen at [facility] from 05/11/22 to 05/22/2022 due to acute kidney injury with possible issues of metformin and alcohol use (250 ml of vodka daily)and metformin use. He has a history of alcohol use for at least 20 years at consumption levels described as heavy. In MICU he was thought to have encephalopathy with negative CT of scanning of brain\par There is no prior history of alcohol withdrawal seizure , delirium or tremor.He had dialysis twice in MICU and no dialysis currently\par There is no history of known  liver or spleen disease\par \par medical history of type 2 diabetes mellitus for at least 10 years. currently he is not requiring use of oral agents to control diabetes [FreeTextEntry1] : first visit [de-identified] : At home he ingests Glycerna 16 oz bottle 3 daily 48 oz daily. 69 grams of protein daily. He had mild right leg edema but treated with furosemide\par No fever n o chills and no known bleeding

## 2022-06-29 NOTE — REVIEW OF SYSTEMS
[Negative] : Allergic/Immunologic [Fever] : no fever [Chills] : no chills [Night Sweats] : no night sweats [Fatigue] : no fatigue [Recent Change In Weight] : ~T no recent weight change [Eye Pain] : no eye pain [Red Eyes] : eyes not red [Dry Eyes] : no dryness of the eyes [Vision Problems] : no vision problems [Dysphagia] : no dysphagia [Loss of Hearing] : no loss of hearing [Nosebleeds] : no nosebleeds [Hoarseness] : no hoarseness [Odynophagia] : no odynophagia [Mucosal Pain] : no mucosal pain [Chest Pain] : no chest pain [Palpitations] : no palpitations [Leg Claudication] : no intermittent leg claudication [Lower Ext Edema] : no lower extremity edema [Shortness Of Breath] : no shortness of breath [Wheezing] : no wheezing [Cough] : no cough [SOB on Exertion] : no shortness of breath during exertion [Abdominal Pain] : no abdominal pain [Vomiting] : no vomiting [Constipation] : no constipation [Diarrhea] : no diarrhea [Dysuria] : no dysuria [Incontinence] : no incontinence [Joint Pain] : no joint pain [Joint Stiffness] : no joint stiffness [Muscle Pain] : no muscle pain [Skin Rash] : no skin rash [Skin Wound] : no skin wound [Confused] : no confusion [Dizziness] : no dizziness [Fainting] : no fainting [Difficulty Walking] : no difficulty walking [Suicidal] : not suicidal [Insomnia] : no insomnia [Anxiety] : no anxiety [Depression] : no depression [Proptosis] : no proptosis [Hot Flashes] : no hot flashes [Muscle Weakness] : no muscle weakness [Deepening Of The Voice] : no deepening of the voice [FreeTextEntry2] : 5 lbs lost with 5 lbs increase

## 2022-06-29 NOTE — RESULTS/DATA
[FreeTextEntry1] : review of information in the [facility] and data provided by ganesh of [facility] MICU visit

## 2022-06-29 NOTE — REASON FOR VISIT
[Initial Consultation] : an initial consultation for [Blood Count Assessment] : blood count assessment [Family Member] : family member [Other: _____] : [unfilled] [FreeTextEntry2] : anemia

## 2022-06-29 NOTE — PHYSICAL EXAM
[Ambulatory and capable of all self care but unable to carry out any work activities] : Status 2- Ambulatory and capable of all self care but unable to carry out any work activities. 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

## 2022-06-29 NOTE — ASSESSMENT
[Supportive] : Goals of care discussed with patient: Supportive [Palliative Care Plan] : not applicable at this time [FreeTextEntry1] : JAXON Fernando is a new patient referred to our service for evaluation of anemia; he had a HGB in range of 8 grams at [facility] in May 2022 when he was admitted for treatment of renal failure dehydration and alcohol toxicity. HGB has improved in absent of transfusion. His renal insult has also improved and he only required two dialysis treatments.\par The patient is recommended to have GI service consulted if HGB fails to improve. \par He has been improving at home. Causes for anemia are multifactorial including acute medical illness and alcohol suppression of bone marrow. As he is not taking alcohol presently these issues are expected to resolve. He promises to avoid the use of alcohol.\par No transfusion is planned at this time. RTC 3 months

## 2022-07-22 ENCOUNTER — RX RENEWAL (OUTPATIENT)
Age: 72
End: 2022-07-22

## 2022-09-23 ENCOUNTER — OUTPATIENT (OUTPATIENT)
Dept: OUTPATIENT SERVICES | Facility: HOSPITAL | Age: 72
LOS: 1 days | Discharge: HOME | End: 2022-09-23

## 2022-09-23 DIAGNOSIS — D64.9 ANEMIA, UNSPECIFIED: ICD-10-CM

## 2022-09-28 ENCOUNTER — APPOINTMENT (OUTPATIENT)
Dept: HEMATOLOGY ONCOLOGY | Facility: CLINIC | Age: 72
End: 2022-09-28

## 2022-10-12 ENCOUNTER — APPOINTMENT (OUTPATIENT)
Dept: INTERNAL MEDICINE | Facility: CLINIC | Age: 72
End: 2022-10-12

## 2022-10-12 PROCEDURE — 36415 COLL VENOUS BLD VENIPUNCTURE: CPT

## 2022-10-14 LAB — PSA SERPL-MCNC: 0.99 NG/ML

## 2022-10-19 ENCOUNTER — NON-APPOINTMENT (OUTPATIENT)
Age: 72
End: 2022-10-19

## 2022-10-19 DIAGNOSIS — N17.9 ACUTE KIDNEY FAILURE, UNSPECIFIED: ICD-10-CM

## 2022-10-19 LAB
25(OH)D3 SERPL-MCNC: 50.4 NG/ML
ALBUMIN SERPL ELPH-MCNC: 4.7 G/DL
ALP BLD-CCNC: 70 U/L
ALT SERPL-CCNC: 18 U/L
ANION GAP SERPL CALC-SCNC: 23 MMOL/L
APPEARANCE: CLEAR
AST SERPL-CCNC: 27 U/L
BASOPHILS # BLD AUTO: 0.05 K/UL
BASOPHILS NFR BLD AUTO: 0.7 %
BILIRUB SERPL-MCNC: 0.2 MG/DL
BILIRUBIN URINE: NEGATIVE
BLOOD URINE: NEGATIVE
BUN SERPL-MCNC: 11 MG/DL
CALCIUM SERPL-MCNC: 10.2 MG/DL
CHLORIDE SERPL-SCNC: 112 MMOL/L
CHOLEST SERPL-MCNC: 175 MG/DL
CO2 SERPL-SCNC: 18 MMOL/L
COLOR: NORMAL
CREAT SERPL-MCNC: 1.42 MG/DL
EGFR: 53 ML/MIN/1.73M2
EOSINOPHIL # BLD AUTO: 0.38 K/UL
EOSINOPHIL NFR BLD AUTO: 5.1 %
ESTIMATED AVERAGE GLUCOSE: 137 MG/DL
FERRITIN SERPL-MCNC: 38 NG/ML
FOLATE SERPL-MCNC: 19.7 NG/ML
GLUCOSE QUALITATIVE U: NEGATIVE
GLUCOSE SERPL-MCNC: 94 MG/DL
HBA1C MFR BLD HPLC: 6.4 %
HCT VFR BLD CALC: 41.1 %
HDLC SERPL-MCNC: 66 MG/DL
HGB BLD-MCNC: 12.7 G/DL
IMM GRANULOCYTES NFR BLD AUTO: 0.1 %
IRON SATN MFR SERPL: 9 %
IRON SERPL-MCNC: 44 UG/DL
KETONES URINE: NEGATIVE
LDLC SERPL CALC-MCNC: 83 MG/DL
LEUKOCYTE ESTERASE URINE: NEGATIVE
LYMPHOCYTES # BLD AUTO: 3.29 K/UL
LYMPHOCYTES NFR BLD AUTO: 44.5 %
MAN DIFF?: NORMAL
MCHC RBC-ENTMCNC: 25.9 PG
MCHC RBC-ENTMCNC: 30.9 GM/DL
MCV RBC AUTO: 83.7 FL
MONOCYTES # BLD AUTO: 0.47 K/UL
MONOCYTES NFR BLD AUTO: 6.4 %
NEUTROPHILS # BLD AUTO: 3.2 K/UL
NEUTROPHILS NFR BLD AUTO: 43.2 %
NITRITE URINE: NEGATIVE
NONHDLC SERPL-MCNC: 110 MG/DL
PH URINE: 6
PLATELET # BLD AUTO: 279 K/UL
POTASSIUM SERPL-SCNC: 5 MMOL/L
PROT SERPL-MCNC: 7.7 G/DL
PROTEIN URINE: NEGATIVE
RBC # BLD: 4.91 M/UL
RBC # FLD: 15.5 %
SODIUM SERPL-SCNC: 154 MMOL/L
SPECIFIC GRAVITY URINE: 1.02
T4 FREE SERPL-MCNC: 1 NG/DL
TIBC SERPL-MCNC: 476 UG/DL
TRIGL SERPL-MCNC: 133 MG/DL
TSH SERPL-ACNC: 0.56 UIU/ML
UIBC SERPL-MCNC: 432 UG/DL
UROBILINOGEN URINE: NORMAL
VIT B12 SERPL-MCNC: >2000 PG/ML
WBC # FLD AUTO: 7.4 K/UL

## 2022-10-21 ENCOUNTER — APPOINTMENT (OUTPATIENT)
Dept: INTERNAL MEDICINE | Facility: CLINIC | Age: 72
End: 2022-10-21

## 2022-10-21 PROCEDURE — 36415 COLL VENOUS BLD VENIPUNCTURE: CPT

## 2022-10-24 LAB
ALBUMIN SERPL ELPH-MCNC: 4.9 G/DL
ALP BLD-CCNC: 61 U/L
ALT SERPL-CCNC: 16 U/L
ANION GAP SERPL CALC-SCNC: 14 MMOL/L
AST SERPL-CCNC: 22 U/L
BILIRUB SERPL-MCNC: 0.4 MG/DL
BUN SERPL-MCNC: 16 MG/DL
CALCIUM SERPL-MCNC: 10.2 MG/DL
CHLORIDE SERPL-SCNC: 101 MMOL/L
CO2 SERPL-SCNC: 26 MMOL/L
CREAT SERPL-MCNC: 1.19 MG/DL
EGFR: 66 ML/MIN/1.73M2
GLUCOSE SERPL-MCNC: 70 MG/DL
POTASSIUM SERPL-SCNC: 4.8 MMOL/L
PROT SERPL-MCNC: 7.6 G/DL
SODIUM SERPL-SCNC: 142 MMOL/L

## 2022-12-02 ENCOUNTER — APPOINTMENT (OUTPATIENT)
Dept: INTERNAL MEDICINE | Facility: CLINIC | Age: 72
End: 2022-12-02

## 2023-01-04 ENCOUNTER — NON-APPOINTMENT (OUTPATIENT)
Age: 73
End: 2023-01-04

## 2023-02-10 ENCOUNTER — APPOINTMENT (OUTPATIENT)
Dept: INTERNAL MEDICINE | Facility: CLINIC | Age: 73
End: 2023-02-10

## 2023-05-23 ENCOUNTER — APPOINTMENT (OUTPATIENT)
Dept: INTERNAL MEDICINE | Facility: CLINIC | Age: 73
End: 2023-05-23
Payer: MEDICARE

## 2023-05-23 ENCOUNTER — NON-APPOINTMENT (OUTPATIENT)
Age: 73
End: 2023-05-23

## 2023-05-23 VITALS — DIASTOLIC BLOOD PRESSURE: 70 MMHG | SYSTOLIC BLOOD PRESSURE: 100 MMHG | WEIGHT: 173 LBS | BODY MASS INDEX: 27.31 KG/M2

## 2023-05-23 DIAGNOSIS — E11.9 TYPE 2 DIABETES MELLITUS W/OUT COMPLICATIONS: ICD-10-CM

## 2023-05-23 DIAGNOSIS — Z00.00 ENCOUNTER FOR GENERAL ADULT MEDICAL EXAMINATION W/OUT ABNORMAL FINDINGS: ICD-10-CM

## 2023-05-23 DIAGNOSIS — D69.3 IMMUNE THROMBOCYTOPENIC PURPURA: ICD-10-CM

## 2023-05-23 DIAGNOSIS — E53.8 DEFICIENCY OF OTHER SPECIFIED B GROUP VITAMINS: ICD-10-CM

## 2023-05-23 DIAGNOSIS — Z23 ENCOUNTER FOR IMMUNIZATION: ICD-10-CM

## 2023-05-23 DIAGNOSIS — F10.10 ALCOHOL ABUSE, UNCOMPLICATED: ICD-10-CM

## 2023-05-23 DIAGNOSIS — T78.40XA ALLERGY, UNSPECIFIED, INITIAL ENCOUNTER: ICD-10-CM

## 2023-05-23 PROCEDURE — 90677 PCV20 VACCINE IM: CPT

## 2023-05-23 PROCEDURE — 96372 THER/PROPH/DIAG INJ SC/IM: CPT

## 2023-05-23 PROCEDURE — 36415 COLL VENOUS BLD VENIPUNCTURE: CPT

## 2023-05-23 PROCEDURE — G0438: CPT

## 2023-05-23 PROCEDURE — G0009: CPT

## 2023-05-23 PROCEDURE — 93000 ELECTROCARDIOGRAM COMPLETE: CPT | Mod: 59

## 2023-05-23 RX ORDER — CYANOCOBALAMIN 1000 UG/ML
1000 INJECTION INTRAMUSCULAR; SUBCUTANEOUS
Qty: 0 | Refills: 0 | Status: COMPLETED | OUTPATIENT
Start: 2023-05-23

## 2023-05-23 RX ORDER — ESCITALOPRAM OXALATE 10 MG/1
10 TABLET ORAL
Qty: 30 | Refills: 0 | Status: DISCONTINUED | COMMUNITY
Start: 2020-12-11 | End: 2023-05-23

## 2023-05-23 RX ORDER — CLOBETASOL PROPIONATE 0.5 MG/G
0.05 OINTMENT TOPICAL
Qty: 1 | Refills: 0 | Status: DISCONTINUED | COMMUNITY
Start: 2023-01-04 | End: 2023-05-23

## 2023-05-23 RX ORDER — SEVELAMER CARBONATE 800 MG/1
800 TABLET, FILM COATED ORAL TWICE DAILY
Refills: 0 | Status: DISCONTINUED | COMMUNITY
Start: 2022-06-06 | End: 2023-05-23

## 2023-05-23 RX ADMIN — CYANOCOBALAMIN 0 MCG/ML: 1000 INJECTION INTRAMUSCULAR; SUBCUTANEOUS at 00:00

## 2023-05-24 LAB
ALBUMIN SERPL ELPH-MCNC: 4.6 G/DL
ALP BLD-CCNC: 63 U/L
ALT SERPL-CCNC: 24 U/L
ANION GAP SERPL CALC-SCNC: 16 MMOL/L
AST SERPL-CCNC: 24 U/L
BILIRUB SERPL-MCNC: 0.3 MG/DL
BUN SERPL-MCNC: 16 MG/DL
CALCIUM SERPL-MCNC: 10.2 MG/DL
CHLORIDE SERPL-SCNC: 100 MMOL/L
CHOLEST SERPL-MCNC: 154 MG/DL
CO2 SERPL-SCNC: 25 MMOL/L
CREAT SERPL-MCNC: 1.22 MG/DL
CREAT SPEC-SCNC: 249 MG/DL
CREAT SPEC-SCNC: 249 MG/DL
CREAT/PROT UR: 0.1 RATIO
EGFR: 64 ML/MIN/1.73M2
ESTIMATED AVERAGE GLUCOSE: 111 MG/DL
GLUCOSE SERPL-MCNC: 88 MG/DL
HBA1C MFR BLD HPLC: 5.5 %
HDLC SERPL-MCNC: 70 MG/DL
LDLC SERPL CALC-MCNC: 56 MG/DL
MICROALBUMIN 24H UR DL<=1MG/L-MCNC: 2.3 MG/DL
MICROALBUMIN/CREAT 24H UR-RTO: 9 MG/G
NONHDLC SERPL-MCNC: 84 MG/DL
POTASSIUM SERPL-SCNC: 4.9 MMOL/L
PROT SERPL-MCNC: 7.6 G/DL
PROT UR-MCNC: 16 MG/DL
SODIUM SERPL-SCNC: 141 MMOL/L
TRIGL SERPL-MCNC: 144 MG/DL

## 2023-05-24 NOTE — PHYSICAL EXAM
[No Acute Distress] : no acute distress [Well Nourished] : well nourished [EOMI] : extraocular movements intact [PERRL] : pupils equal round and reactive to light [Normal Oropharynx] : the oropharynx was normal [Normal TMs] : both tympanic membranes were normal [Normal Nasal Mucosa] : the nasal mucosa was normal [No Lymphadenopathy] : no lymphadenopathy [Supple] : supple [Thyroid Normal, No Nodules] : the thyroid was normal and there were no nodules present [No Accessory Muscle Use] : no accessory muscle use [Clear to Auscultation] : lungs were clear to auscultation bilaterally [Regular Rhythm] : with a regular rhythm [Normal S1, S2] : normal S1 and S2 [No Murmur] : no murmur heard [No Edema] : there was no peripheral edema [Soft] : abdomen soft [Non Tender] : non-tender [Non-distended] : non-distended [No Masses] : no abdominal mass palpated [No HSM] : no HSM [Normal Bowel Sounds] : normal bowel sounds [Normal Supraclavicular Nodes] : no supraclavicular lymphadenopathy [Normal Posterior Cervical Nodes] : no posterior cervical lymphadenopathy [Normal Anterior Cervical Nodes] : no anterior cervical lymphadenopathy [No Spinal Tenderness] : no spinal tenderness [Normal] : the cranial nerves were intact [Sensation Tactile Decrease] : light touch was intact [2+] : left 2+ [Normal Affect] : the affect was normal [Normal Insight/Judgement] : insight and judgment were intact [Comprehensive Foot Exam Normal] : Right and left foot were examined and both feet are normal. No ulcers in either foot. Toes are normal and with full ROM.  Normal tactile sensation with monofilament testing throughout both feet [FreeTextEntry1] : deferred to gu  [de-identified] : normal color and pigmentation of nevi

## 2023-05-24 NOTE — ASSESSMENT
[FreeTextEntry1] : 72M c hypothyrodism, diet controlled diabetes, HLD, history of CHIARA 2/2 alcohol dependence & metformin requiring temporary CRRT (2022), depression, B12 deficiency,  BPH s/p TURP, history of recurrent UTI, lumbar back pain here for CPE \par \par GMM - check fasting BW in office \par due for optho exam \par due for dental exam \par advised screening colonscopy\par due for urology f/u \par prevnar 20 given today \par advised shingrix vaccine (d/w pt risks and benefits) - pt to check with insurance for coverage - paper rx also given\par can get covid-19 booster\par B12 IM given today \par \par rtc in 6 months \par  \par

## 2023-06-05 ENCOUNTER — TRANSCRIPTION ENCOUNTER (OUTPATIENT)
Age: 73
End: 2023-06-05

## 2023-06-05 ENCOUNTER — NON-APPOINTMENT (OUTPATIENT)
Age: 73
End: 2023-06-05

## 2023-06-05 LAB
25(OH)D3 SERPL-MCNC: 45.7 NG/ML
APPEARANCE: CLEAR
BACTERIA UR CULT: NORMAL
BILIRUBIN URINE: ABNORMAL
BLOOD URINE: NEGATIVE
COLOR: NORMAL
FOLATE SERPL-MCNC: >20 NG/ML
GLUCOSE QUALITATIVE U: NEGATIVE MG/DL
KETONES URINE: ABNORMAL MG/DL
LEUKOCYTE ESTERASE URINE: NEGATIVE
NITRITE URINE: NEGATIVE
PH URINE: 5.5
PROTEIN URINE: NORMAL MG/DL
PSA SERPL-MCNC: 0.65 NG/ML
SPECIFIC GRAVITY URINE: 1.02
TSH SERPL-ACNC: 0.05 UIU/ML
UROBILINOGEN URINE: 0.2 MG/DL
VIT B12 SERPL-MCNC: 607 PG/ML

## 2023-07-21 ENCOUNTER — APPOINTMENT (OUTPATIENT)
Dept: INTERNAL MEDICINE | Facility: CLINIC | Age: 73
End: 2023-07-21

## 2024-01-31 ENCOUNTER — RX RENEWAL (OUTPATIENT)
Age: 74
End: 2024-01-31

## 2024-01-31 RX ORDER — BETAMETHASONE DIPROPIONATE 0.5 MG/G
0.05 OINTMENT TOPICAL TWICE DAILY
Qty: 60 | Refills: 23 | Status: ACTIVE | COMMUNITY
Start: 2023-05-23 | End: 1900-01-01

## 2024-02-05 ENCOUNTER — TRANSCRIPTION ENCOUNTER (OUTPATIENT)
Age: 74
End: 2024-02-05

## 2024-02-05 DIAGNOSIS — E11.9 TYPE 2 DIABETES MELLITUS W/OUT COMPLICATIONS: ICD-10-CM

## 2024-03-10 ENCOUNTER — RX RENEWAL (OUTPATIENT)
Age: 74
End: 2024-03-10

## 2024-03-11 ENCOUNTER — RX RENEWAL (OUTPATIENT)
Age: 74
End: 2024-03-11

## 2024-03-11 RX ORDER — GABAPENTIN 800 MG/1
800 TABLET, COATED ORAL EVERY 8 HOURS
Qty: 270 | Refills: 3 | Status: ACTIVE | COMMUNITY
Start: 2023-01-04 | End: 1900-01-01

## 2024-03-22 RX ORDER — SITAGLIPTIN 100 MG/1
100 TABLET, FILM COATED ORAL
Qty: 90 | Refills: 1 | Status: ACTIVE | COMMUNITY
Start: 2024-02-05 | End: 1900-01-01

## 2024-07-02 ENCOUNTER — RX RENEWAL (OUTPATIENT)
Age: 74
End: 2024-07-02

## 2024-07-22 ENCOUNTER — TRANSCRIPTION ENCOUNTER (OUTPATIENT)
Age: 74
End: 2024-07-22

## 2024-10-03 NOTE — ED ADULT TRIAGE NOTE - ADDITIONAL SAFETY/BANDS...
"  Caller: Campbell Alex R \"Zuleyma\"    Relationship: Self    Best call back number:        CELL [phone]    HOME: [phone]      What is the best time to reach you: ANYTIME TODAY     AFTER TODAY , CAN REACH ANYTIME IN THE MORNING     EXCEPT 10:30AM T0 11AM , AND ONLY ON CELL PHONE     Who are you requesting to speak with (clinical staff, provider,  specific staff member): GERMÁN        What was the call regarding: HAD A CALL FROM GERMÁN, TRYING TO RETURN CALL TO GERMÁN HAD LEFT A MESSAGE ABOUT APPT 10/22      AND CAMPBELL  HAS NOT BEEN ABLE TO REACH [phone] WHEN TRYING TO CALL THIS NUMBER IT SAYS THIS NUMBER CANNOT LEAVE MESSAGES AND TO CALL BACK SAME NUMBER [phone] AND JUST GOES IN A [place] WHEN TRYING TO CALL.    Is it okay if the provider responds through [employer]: NO      " Additional Safety/Bands:

## 2025-07-17 NOTE — ED ADULT NURSE NOTE - OBJECTIVE STATEMENT
"Goal Outcome Evaluation:      Plan of Care Reviewed With: patient    Overall Patient Progress: improvingOverall Patient Progress: improving         VS: /74 (BP Location: Right arm)   Pulse 114   Temp 97.7  F (36.5  C) (Tympanic)   Resp 18   Ht 1.645 m (5' 4.76\")   Wt 50.4 kg (111 lb 1.8 oz)   SpO2 97%   BMI 18.62 kg/m       O2: Stable @ RA,Denies SOB ,Chest pain   Output: Continent of b/b   Last BM: 7/11/25, Miralax and Senna given   Activity: Independent in room   Skin: R.Central [place] Access   Pain: Pain managed with oral Dilaudid   CMS: [id number]   Dressing: C/D/I Central line dressing   Diet: Continuous  feeding changed @ 3pm to 35ml/hr   LDA: R.chest port   Equipment: Personal belongings   Plan: Continue with POC   Additional Info: RN Managed K,Mag,Ph.  Potasium & Phosphorus replaced              " 71 year old male BIBEMS from home c/o weakness. Pt discharged from [facility] today with newly diagnosed diabetes. As per family at bedside, pt very weak and lethargic at home, having difficulty ambulating and concerned for safety. Blood sugar with EMS in 300s. Upon ED arrival, blood sugar 194. Upon assessment, pt A+Ox3 and lethargic. Pt denies further complaints.